# Patient Record
Sex: FEMALE | Race: BLACK OR AFRICAN AMERICAN | Employment: OTHER | URBAN - METROPOLITAN AREA
[De-identification: names, ages, dates, MRNs, and addresses within clinical notes are randomized per-mention and may not be internally consistent; named-entity substitution may affect disease eponyms.]

---

## 2017-02-27 PROBLEM — R65.21 SEPTIC SHOCK (HCC): Status: ACTIVE | Noted: 2017-02-27

## 2017-02-27 PROBLEM — A41.9 SEPTIC SHOCK (HCC): Status: ACTIVE | Noted: 2017-02-27

## 2017-02-27 PROBLEM — A41.9 SEPSIS (HCC): Status: ACTIVE | Noted: 2017-02-27

## 2017-02-27 PROBLEM — N10 PYELONEPHRITIS, ACUTE: Status: ACTIVE | Noted: 2017-02-27

## 2017-02-28 PROBLEM — A41.9 SEPTIC SHOCK (HCC): Status: RESOLVED | Noted: 2017-02-27 | Resolved: 2017-02-28

## 2017-02-28 PROBLEM — I95.9 HYPOTENSION: Status: ACTIVE | Noted: 2017-02-28

## 2017-02-28 PROBLEM — R65.21 SEPTIC SHOCK (HCC): Status: RESOLVED | Noted: 2017-02-27 | Resolved: 2017-02-28

## 2017-02-28 PROBLEM — A41.50 GRAM NEGATIVE SEPSIS (HCC): Status: ACTIVE | Noted: 2017-02-28

## 2018-07-15 ENCOUNTER — HOSPITAL ENCOUNTER (EMERGENCY)
Age: 58
Discharge: HOME | End: 2018-07-15
Attending: EMERGENCY MEDICINE
Payer: MEDICAID

## 2018-07-15 VITALS
OXYGEN SATURATION: 98 % | DIASTOLIC BLOOD PRESSURE: 74 MMHG | HEART RATE: 80 BPM | TEMPERATURE: 98.4 F | HEIGHT: 62 IN | RESPIRATION RATE: 16 BRPM | SYSTOLIC BLOOD PRESSURE: 113 MMHG | BODY MASS INDEX: 23.92 KG/M2 | WEIGHT: 130 LBS

## 2018-07-15 DIAGNOSIS — J06.9 UPPER RESPIRATORY TRACT INFECTION, UNSPECIFIED TYPE: ICD-10-CM

## 2018-07-15 DIAGNOSIS — R30.0 DYSURIA: Primary | ICD-10-CM

## 2018-07-15 LAB
BILIRUBIN URINE: NEGATIVE
BLOOD, URINE: NEGATIVE
CLARITY: CLEAR
COLOR: YELLOW
GLUCOSE URINE: NEGATIVE MG/DL
KETONES, URINE: NEGATIVE MG/DL
LEUKOCYTE ESTERASE, URINE: NEGATIVE
NITRITE, URINE: NEGATIVE
PH UA: 7 (ref 5–9)
PROTEIN UA: NEGATIVE MG/DL
SPECIFIC GRAVITY UA: 1.01 (ref 1–1.03)
UROBILINOGEN, URINE: 0.2 E.U./DL

## 2018-07-15 PROCEDURE — 87088 URINE BACTERIA CULTURE: CPT

## 2018-07-15 PROCEDURE — 99283 EMERGENCY DEPT VISIT LOW MDM: CPT

## 2018-07-15 PROCEDURE — 87077 CULTURE AEROBIC IDENTIFY: CPT

## 2018-07-15 PROCEDURE — 81003 URINALYSIS AUTO W/O SCOPE: CPT

## 2018-07-15 PROCEDURE — 87186 SC STD MICRODIL/AGAR DIL: CPT

## 2018-07-15 ASSESSMENT — ENCOUNTER SYMPTOMS
SHORTNESS OF BREATH: 0
ABDOMINAL PAIN: 0
NAUSEA: 0
STRIDOR: 0
VOMITING: 0
BACK PAIN: 1
SORE THROAT: 1

## 2018-07-16 NOTE — ED PROVIDER NOTES
HPI:  7/15/18,   Time: 11:04 PM         Jacque Salgado is a 59 y.o. female presenting to the ED for evaluation of discomfort urinating, beginning a few days ago. She states over the past week she is also noted bilateral ear pain and sore throat. Eyes fevers or chills. Denies a social nausea, vomiting, or diarrhea. Patient also has pain that radiates into her lower back. States that she wants became septic from a urinary tract infection therefore she wants to be treated early. She is currently on methotrexate. Denies presence of blood in the urine. Patient also states that her lymph nodes are swollen in her neck. She states her PCP diagnosed her with lymphoma and she has been following with the [facility] for this. She has an appointment on the 19th. They've not yet start treatment. The complaint has been intermittent, moderate in severity, and worsened by urinating. ROS: Review of Systems   Constitutional: Negative for chills, fever and malaise/fatigue. HENT: Positive for congestion, ear pain and sore throat. Respiratory: Negative for shortness of breath and stridor. Cardiovascular: Negative for chest pain. Gastrointestinal: Negative for abdominal pain, nausea and vomiting. Genitourinary: Positive for frequency and urgency. Negative for dysuria, flank pain and hematuria. Musculoskeletal: Positive for back pain. Neurological: Negative for dizziness and headaches. Endo/Heme/Allergies: Does not bruise/bleed easily. All other systems reviewed and are negative. Pertinent positives and negatives are stated within HPI, all other systems reviewed and are negative.  --------------------------------------------- PAST HISTORY ---------------------------------------------  Past Medical History:  has a past medical history of Arrhythmia; Arthritis; Hepatitis B; Sarcoidosis ([facility] [address] and Ulcer ([facility]     Past Surgical History:  has a past surgical history that includes Cholecystectomy; Upper respiratory tract infection, unspecified type        DISPOSITION  Disposition: Discharge to home  Patient condition is good                Vanessa Hein DO  07/15/18 [id number]

## 2018-07-18 LAB
ORGANISM: ABNORMAL
URINE CULTURE, ROUTINE: ABNORMAL
URINE CULTURE, ROUTINE: ABNORMAL

## 2019-03-07 ENCOUNTER — HOSPITAL ENCOUNTER (OUTPATIENT)
Age: 59
Discharge: HOME | End: 2019-03-07
Payer: MEDICAID

## 2019-03-07 LAB
ALBUMIN SERPL-MCNC: 4.4 G/DL (ref 3.5–5.2)
ALP BLD-CCNC: 48 U/L (ref 35–104)
ALT SERPL-CCNC: 25 U/L (ref 0–32)
AMYLASE: 151 U/L (ref 20–100)
ANION GAP SERPL CALCULATED.3IONS-SCNC: 10 MMOL/L (ref 7–16)
AST SERPL-CCNC: 29 U/L (ref 0–31)
BASOPHILS ABSOLUTE: 0.05 E9/L (ref 0–0.2)
BASOPHILS RELATIVE PERCENT: 0.9 % (ref 0–2)
BILIRUB SERPL-MCNC: 0.2 MG/DL (ref 0–1.2)
BUN BLDV-MCNC: 5 MG/DL (ref 8–23)
CALCIUM SERPL-MCNC: 9.6 MG/DL (ref 8.6–10.2)
CHLORIDE BLD-SCNC: 99 MMOL/L (ref 98–107)
CO2: 28 MMOL/L (ref 22–29)
CREAT SERPL-MCNC: 0.8 MG/DL (ref 0.5–1)
EOSINOPHILS ABSOLUTE: 0.18 E9/L (ref 0.05–0.5)
EOSINOPHILS RELATIVE PERCENT: 3.3 % (ref 0–6)
GFR AFRICAN AMERICAN: >60
GFR NON-AFRICAN AMERICAN: >60 ML/MIN/1.73
GLUCOSE BLD-MCNC: 87 MG/DL (ref 74–99)
HCT VFR BLD CALC: 38.4 % (ref 34–48)
HEMOGLOBIN: 12.5 G/DL (ref 11.5–15.5)
IMMATURE GRANULOCYTES #: 0.01 E9/L
IMMATURE GRANULOCYTES %: 0.2 % (ref 0–5)
LIPASE: 28 U/L (ref 13–60)
LYMPHOCYTES ABSOLUTE: 2.13 E9/L (ref 1.5–4)
LYMPHOCYTES RELATIVE PERCENT: 39.4 % (ref 20–42)
MCH RBC QN AUTO: 31.2 PG (ref 26–35)
MCHC RBC AUTO-ENTMCNC: 32.6 % (ref 32–34.5)
MCV RBC AUTO: 95.8 FL (ref 80–99.9)
MONOCYTES ABSOLUTE: 0.34 E9/L (ref 0.1–0.95)
MONOCYTES RELATIVE PERCENT: 6.3 % (ref 2–12)
NEUTROPHILS ABSOLUTE: 2.69 E9/L (ref 1.8–7.3)
NEUTROPHILS RELATIVE PERCENT: 49.9 % (ref 43–80)
PDW BLD-RTO: 12.8 FL (ref 11.5–15)
PLATELET # BLD: 219 E9/L (ref 130–450)
PMV BLD AUTO: 9.6 FL (ref 7–12)
POTASSIUM SERPL-SCNC: 3.3 MMOL/L (ref 3.5–5)
RBC # BLD: 4.01 E12/L (ref 3.5–5.5)
SODIUM BLD-SCNC: 137 MMOL/L (ref 132–146)
TOTAL PROTEIN: 8 G/DL (ref 6.4–8.3)
WBC # BLD: 5.4 E9/L (ref 4.5–11.5)

## 2019-03-07 PROCEDURE — 80053 COMPREHEN METABOLIC PANEL: CPT

## 2019-03-07 PROCEDURE — 83690 ASSAY OF LIPASE: CPT

## 2019-03-07 PROCEDURE — 36415 COLL VENOUS BLD VENIPUNCTURE: CPT

## 2019-03-07 PROCEDURE — 82150 ASSAY OF AMYLASE: CPT

## 2019-03-07 PROCEDURE — 85025 COMPLETE CBC W/AUTO DIFF WBC: CPT

## 2019-09-20 ENCOUNTER — HOSPITAL ENCOUNTER (EMERGENCY)
Age: 59
Discharge: HOME | End: 2019-09-20
Attending: EMERGENCY MEDICINE
Payer: MEDICAID

## 2019-09-20 ENCOUNTER — APPOINTMENT (OUTPATIENT)
Dept: GENERAL RADIOLOGY | Age: 59
End: 2019-09-20
Payer: MEDICAID

## 2019-09-20 VITALS
HEIGHT: 62 IN | DIASTOLIC BLOOD PRESSURE: 72 MMHG | HEART RATE: 72 BPM | WEIGHT: 137 LBS | RESPIRATION RATE: 18 BRPM | OXYGEN SATURATION: 99 % | SYSTOLIC BLOOD PRESSURE: 119 MMHG | BODY MASS INDEX: 25.21 KG/M2

## 2019-09-20 DIAGNOSIS — M25.561 ACUTE PAIN OF RIGHT KNEE: Primary | ICD-10-CM

## 2019-09-20 PROCEDURE — 99283 EMERGENCY DEPT VISIT LOW MDM: CPT

## 2019-09-20 PROCEDURE — 73560 X-RAY EXAM OF KNEE 1 OR 2: CPT

## 2019-09-20 PROCEDURE — 6370000000 HC RX 637 (ALT 250 FOR IP): Performed by: EMERGENCY MEDICINE

## 2019-09-20 RX ORDER — ACETAMINOPHEN 325 MG/1
650 TABLET ORAL ONCE
Status: COMPLETED | OUTPATIENT
Start: 2019-09-20 | End: 2019-09-20

## 2019-09-20 RX ADMIN — ACETAMINOPHEN 650 MG: 325 TABLET, FILM COATED ORAL at 18:22

## 2019-09-20 ASSESSMENT — PAIN SCALES - GENERAL
PAINLEVEL_OUTOF10: 5
PAINLEVEL_OUTOF10: 5

## 2019-09-20 ASSESSMENT — PAIN - FUNCTIONAL ASSESSMENT: PAIN_FUNCTIONAL_ASSESSMENT: PREVENTS OR INTERFERES SOME ACTIVE ACTIVITIES AND ADLS

## 2019-09-20 ASSESSMENT — ENCOUNTER SYMPTOMS
VOMITING: 0
BACK PAIN: 0
NAUSEA: 0
EYE PAIN: 0
WHEEZING: 0
ABDOMINAL DISTENTION: 0
DIARRHEA: 0
EYE REDNESS: 0
COUGH: 0
SINUS PRESSURE: 0
SHORTNESS OF BREATH: 0
EYE DISCHARGE: 0
SORE THROAT: 0

## 2019-09-20 ASSESSMENT — PAIN DESCRIPTION - LOCATION: LOCATION: KNEE

## 2019-09-20 ASSESSMENT — PAIN DESCRIPTION - PAIN TYPE: TYPE: ACUTE PAIN

## 2019-09-20 ASSESSMENT — PAIN DESCRIPTION - DESCRIPTORS: DESCRIPTORS: ACHING;DISCOMFORT

## 2019-09-20 ASSESSMENT — PAIN DESCRIPTION - ORIENTATION: ORIENTATION: RIGHT

## 2019-09-20 ASSESSMENT — PAIN DESCRIPTION - ONSET: ONSET: ON-GOING

## 2019-09-20 ASSESSMENT — PAIN DESCRIPTION - PROGRESSION: CLINICAL_PROGRESSION: GRADUALLY WORSENING

## 2019-09-20 ASSESSMENT — PAIN DESCRIPTION - FREQUENCY: FREQUENCY: CONTINUOUS

## 2019-09-20 NOTE — ED PROVIDER NOTES
todays results, in addition to providing specific details for the plan of care and counseling regarding the diagnosis and prognosis. Their questions are answered at this time and they are agreeable with the plan. I discussed at length with them reasons for immediate return here for re evaluation. They will followup with primary care by calling their office tomorrow. --------------------------------- ADDITIONAL PROVIDER NOTES ---------------------------------  At this time the patient is without objective evidence of an acute process requiring hospitalization or inpatient management. They have remained hemodynamically stable throughout their entire ED visit and are stable for discharge with outpatient follow-up. The plan has been discussed in detail and they are aware of the specific conditions for emergent return, as well as the importance of follow-up. Discharge Medication List as of 9/20/2019  7:14 PM          Diagnosis:  1. Acute pain of right knee        Disposition:  Patient's disposition: Discharge to home  Patient's condition is stable.          Irineo Morgan DO  Resident  09/20/19 2120

## 2020-02-09 ENCOUNTER — HOSPITAL ENCOUNTER (EMERGENCY)
Age: 60
Discharge: HOME | End: 2020-02-09
Attending: EMERGENCY MEDICINE
Payer: MEDICAID

## 2020-02-09 VITALS
SYSTOLIC BLOOD PRESSURE: 152 MMHG | DIASTOLIC BLOOD PRESSURE: 74 MMHG | OXYGEN SATURATION: 100 % | TEMPERATURE: 98.6 F | HEART RATE: 88 BPM | RESPIRATION RATE: 18 BRPM | WEIGHT: 135 LBS | HEIGHT: 62 IN | BODY MASS INDEX: 24.84 KG/M2

## 2020-02-09 PROCEDURE — 6360000002 HC RX W HCPCS: Performed by: EMERGENCY MEDICINE

## 2020-02-09 PROCEDURE — 99282 EMERGENCY DEPT VISIT SF MDM: CPT

## 2020-02-09 PROCEDURE — 96372 THER/PROPH/DIAG INJ SC/IM: CPT

## 2020-02-09 RX ORDER — MORPHINE SULFATE 5 MG/ML
5 INJECTION, SOLUTION INTRAMUSCULAR; INTRAVENOUS ONCE
Status: COMPLETED | OUTPATIENT
Start: 2020-02-09 | End: 2020-02-09

## 2020-02-09 RX ADMIN — MORPHINE SULFATE 5 MG: 5 INJECTION, SOLUTION INTRAMUSCULAR; INTRAVENOUS at 08:09

## 2020-02-09 ASSESSMENT — ENCOUNTER SYMPTOMS
BACK PAIN: 0
EYE REDNESS: 0
COUGH: 0
VOMITING: 0
WHEEZING: 0
SHORTNESS OF BREATH: 0
DIARRHEA: 0
SORE THROAT: 0
EYE DISCHARGE: 0
ABDOMINAL DISTENTION: 0
EYE PAIN: 0
SINUS PRESSURE: 0
NAUSEA: 0

## 2020-02-09 ASSESSMENT — PAIN SCALES - GENERAL: PAINLEVEL_OUTOF10: 10

## 2020-02-09 NOTE — ED PROVIDER NOTES
[ibuprofen]; and Tape [adhesive tape]    -------------------------------------------------- RESULTS -------------------------------------------------  Labs:  No results found for this visit on 02/09/20. Radiology:  No orders to display       ------------------------- NURSING NOTES AND VITALS REVIEWED ---------------------------  Date / Time Roomed:  2/9/2020  7:30 AM  ED Bed Assignment:  10/10    The nursing notes within the ED encounter and vital signs as below have been reviewed. BP (!) 152/74   Pulse 88   Temp 98.6 °F (37 °C) (Oral)   Resp 18   Ht 5' 2\" (1.575 m)   Wt 135 lb (61.2 kg)   LMP 11/12/2012 (LMP Unknown)   SpO2 100%   BMI 24.69 kg/m²   Oxygen Saturation Interpretation: Normal      ------------------------------------------ PROGRESS NOTES ------------------------------------------  7:57 AM  I have spoken with the patient and discussed todays results, in addition to providing specific details for the plan of care and counseling regarding the diagnosis and prognosis. Their questions are answered at this time and they are agreeable with the plan. I discussed at length with them reasons for immediate return here for re evaluation. They will followup with their primary care physician by calling their office tomorrow. --------------------------------- ADDITIONAL PROVIDER NOTES ---------------------------------  At this time the patient is without objective evidence of an acute process requiring hospitalization or inpatient management. They have remained hemodynamically stable throughout their entire ED visit and are stable for discharge with outpatient follow-up. The plan has been discussed in detail and they are aware of the specific conditions for emergent return, as well as the importance of follow-up. New Prescriptions    No medications on file       Diagnosis:  1.  Post-operative pain        Disposition:  Patient's disposition: Discharge to home  Patient's condition is stable.           Isabella Torres, [address] [place] [address]  02/09/20 2002

## 2020-08-03 ENCOUNTER — HOSPITAL ENCOUNTER (EMERGENCY)
Age: 60
Discharge: HOME | End: 2020-08-03
Attending: EMERGENCY MEDICINE
Payer: MEDICAID

## 2020-08-03 VITALS
OXYGEN SATURATION: 99 % | DIASTOLIC BLOOD PRESSURE: 81 MMHG | HEIGHT: 62 IN | TEMPERATURE: 98.9 F | SYSTOLIC BLOOD PRESSURE: 128 MMHG | WEIGHT: 140 LBS | HEART RATE: 100 BPM | BODY MASS INDEX: 25.76 KG/M2 | RESPIRATION RATE: 18 BRPM

## 2020-08-03 PROCEDURE — 99282 EMERGENCY DEPT VISIT SF MDM: CPT

## 2020-08-03 RX ORDER — OXYCODONE HYDROCHLORIDE AND ACETAMINOPHEN 5; 325 MG/1; MG/1
1 TABLET ORAL EVERY 6 HOURS PRN
Qty: 10 TABLET | Refills: 0 | Status: SHIPPED | OUTPATIENT
Start: 2020-08-03 | End: 2020-08-07

## 2020-08-03 RX ORDER — ACYCLOVIR 400 MG/1
800 TABLET ORAL
Qty: 70 TABLET | Refills: 0 | Status: SHIPPED | OUTPATIENT
Start: 2020-08-03 | End: 2020-08-10

## 2020-08-03 RX ORDER — ONDANSETRON 4 MG/1
4 TABLET, ORALLY DISINTEGRATING ORAL EVERY 8 HOURS PRN
Qty: 10 TABLET | Refills: 0 | Status: SHIPPED | OUTPATIENT
Start: 2020-08-03

## 2020-08-03 ASSESSMENT — PAIN SCALES - GENERAL: PAINLEVEL_OUTOF10: 7

## 2020-08-03 ASSESSMENT — ENCOUNTER SYMPTOMS
EYE PAIN: 0
NAUSEA: 1
SINUS PRESSURE: 0
RHINORRHEA: 0
PHOTOPHOBIA: 0
EYE REDNESS: 0
TROUBLE SWALLOWING: 0
FACIAL SWELLING: 0
DIARRHEA: 0
EYE ITCHING: 0
SORE THROAT: 0
EYE DISCHARGE: 0
BACK PAIN: 0
VOMITING: 1
ABDOMINAL DISTENTION: 0
COUGH: 0
WHEEZING: 0
ABDOMINAL PAIN: 0
SHORTNESS OF BREATH: 0

## 2020-08-03 ASSESSMENT — PAIN DESCRIPTION - PAIN TYPE: TYPE: ACUTE PAIN

## 2020-08-03 ASSESSMENT — PAIN DESCRIPTION - LOCATION: LOCATION: FACE

## 2020-08-03 ASSESSMENT — PAIN DESCRIPTION - ORIENTATION: ORIENTATION: LEFT

## 2020-08-03 NOTE — ED PROVIDER NOTES
Chief complaint:  Facial rash    HPI history provided by the patient  Patient comes in stating several days ago she developed a painful lump behind her left ear and then developed some vague symptoms, left general facial pain with couple of bouts of vomiting and just malaise and not feeling well and then broke out in a rash on the left forehead and lateral cheek region. No actual eye pain or eye involvement. No lesions on the nose. No intraoral lesions. No extremity numbness, tingling, paresthesias or weakness. No general headache. No stiff neck. No actual fevers. No chest pain, palpitations or shortness of breath. No cough or congestion or sore throat. No abdominal pain, diarrhea. No flank pain or dysuria. Has tried a little Motrin over the time with mild improvement. Review of Systems   Constitutional: Positive for chills and fatigue. Negative for diaphoresis and fever. HENT: Positive for ear pain. Negative for congestion, facial swelling, postnasal drip, rhinorrhea, sinus pressure, sore throat and trouble swallowing. Eyes: Negative for photophobia, pain, discharge, redness, itching and visual disturbance. Respiratory: Negative for cough, shortness of breath and wheezing. Cardiovascular: Negative for chest pain, palpitations and leg swelling. Gastrointestinal: Positive for nausea and vomiting. Negative for abdominal distention, abdominal pain and diarrhea. Genitourinary: Negative for dysuria, flank pain, frequency, hematuria and urgency. Musculoskeletal: Negative for arthralgias, back pain, gait problem, joint swelling, myalgias, neck pain and neck stiffness. Skin: Positive for rash. Negative for wound. Neurological: Negative for dizziness, seizures, syncope, facial asymmetry, speech difficulty, weakness, light-headedness, numbness and headaches. Hematological: Positive for adenopathy. Does not bruise/bleed easily. All other systems reviewed and are negative.        Physical Exam  Vitals signs and nursing note reviewed. Constitutional:       General: She is not in acute distress. Appearance: She is well-developed. She is not ill-appearing, toxic-appearing or diaphoretic. HENT:      Head: Normocephalic and atraumatic. Comments: Patient with 1 small postauricular lymph node to the left ear area. Otherwise has mild erythematous vesicular rash to the left forehead and lateral cheek and a couple of dots on the left upper eyelid all in a dermatomal pattern not crossing midline consistent with shingles. Right Ear: Tympanic membrane, ear canal and external ear normal.      Left Ear: Tympanic membrane, ear canal and external ear normal.      Ears:      Comments: No lesions on the nasal tip. No lesions in the canals bilaterally. Nose: Nose normal.      Mouth/Throat:      Lips: No lesions. Pharynx: Oropharynx is clear. Uvula midline. No pharyngeal swelling, oropharyngeal exudate, posterior oropharyngeal erythema or uvula swelling. Tonsils: No tonsillar exudate or tonsillar abscesses. Comments: Somewhat poor dentition with no gross necrotic teeth and no gingival erythema, pointing, signs of abscess or ANUG. No trismus or stridor  Eyes:      Extraocular Movements: Extraocular movements intact. Conjunctiva/sclera: Conjunctivae normal.      Pupils: Pupils are equal, round, and reactive to light. Comments: Sclera white, no injection, no drainage, no signs of eye involvement at this time. Neck:      Musculoskeletal: Normal range of motion and neck supple. Normal range of motion. No neck rigidity, injury, pain with movement, spinous process tenderness or muscular tenderness. Trachea: Trachea and phonation normal. No tracheal tenderness or tracheal deviation. Comments: No adenopathy or meningeal signs  Cardiovascular:      Rate and Rhythm: Normal rate and regular rhythm. Heart sounds: Normal heart sounds. No murmur.    Pulmonary: Effort: Pulmonary effort is normal. No respiratory distress. Breath sounds: Normal breath sounds. No stridor. No wheezing, rhonchi or rales. Chest:      Chest wall: No tenderness. Abdominal:      General: Bowel sounds are normal. There is no distension. Palpations: Abdomen is soft. Tenderness: There is no abdominal tenderness. There is no right CVA tenderness, left CVA tenderness, guarding or rebound. Musculoskeletal:         General: No swelling, tenderness, deformity or signs of injury. Right lower leg: No edema. Left lower leg: No edema. Lymphadenopathy:      Cervical: No cervical adenopathy. Skin:     General: Skin is warm and dry. Coloration: Skin is not cyanotic, jaundiced, mottled or pale. Findings: Rash present. No erythema. Comments: Left facial erythematous, vesicular rash with no signs of cellulitis or abscess otherwise. Neurological:      General: No focal deficit present. Mental Status: She is alert and oriented to person, place, and time. Cranial Nerves: No cranial nerve deficit. Coordination: Coordination normal.          Procedures     [facility]                --------------------------------------------- PAST HISTORY ---------------------------------------------  Past Medical History:  has a past medical history of Arrhythmia, Arthritis, Hepatitis B, Sarcoidosis, and Ulcer. Past Surgical History:  has a past surgical history that includes Cholecystectomy; Ectopic pregnancy surgery; Foot surgery (Left); liver biopsy (2015); and Dental surgery. Social History:  reports that she has never smoked. She has never used smokeless tobacco. She reports that she does not drink alcohol or use drugs. Family History: family history includes Arthritis in her paternal grandfather; Diabetes in her maternal grandmother and paternal grandmother; Stroke in her father. The patients home medications have been reviewed.     Allergies: Latex; hours as needed for Nausea or Vomiting    OXYCODONE-ACETAMINOPHEN (PERCOCET) 5-325 MG PER TABLET    Take 1 tablet by mouth every 6 hours as needed for Pain for up to 10 doses. Intended supply: 3 days. Take lowest dose possible to manage pain       Diagnosis:  1. Herpes zoster without complication        Disposition:  Patient's disposition: Discharge to home  Patient's condition is stable.             Josy Rater,   08/03/20 7301

## 2020-12-04 ENCOUNTER — HOSPITAL ENCOUNTER (OUTPATIENT)
Dept: CT IMAGING | Age: 60
Discharge: HOME | End: 2020-12-04
Payer: MEDICAID

## 2020-12-04 PROCEDURE — 74177 CT ABD & PELVIS W/CONTRAST: CPT

## 2021-01-17 ENCOUNTER — HOSPITAL ENCOUNTER (EMERGENCY)
Age: 61
Discharge: HOME | End: 2021-01-17
Attending: EMERGENCY MEDICINE
Payer: MEDICAID

## 2021-01-17 ENCOUNTER — APPOINTMENT (OUTPATIENT)
Dept: GENERAL RADIOLOGY | Age: 61
End: 2021-01-17
Payer: MEDICAID

## 2021-01-17 ENCOUNTER — APPOINTMENT (OUTPATIENT)
Dept: ULTRASOUND IMAGING | Age: 61
End: 2021-01-17
Payer: MEDICAID

## 2021-01-17 VITALS
SYSTOLIC BLOOD PRESSURE: 153 MMHG | OXYGEN SATURATION: 100 % | HEART RATE: 77 BPM | DIASTOLIC BLOOD PRESSURE: 86 MMHG | RESPIRATION RATE: 18 BRPM | BODY MASS INDEX: 25.61 KG/M2 | TEMPERATURE: 98.6 F | WEIGHT: 140 LBS

## 2021-01-17 DIAGNOSIS — E87.1 HYPONATREMIA: Primary | ICD-10-CM

## 2021-01-17 DIAGNOSIS — M25.511 ACUTE PAIN OF RIGHT SHOULDER: ICD-10-CM

## 2021-01-17 DIAGNOSIS — M79.10 MYALGIA: ICD-10-CM

## 2021-01-17 LAB
ALBUMIN SERPL-MCNC: 4 G/DL (ref 3.5–5.2)
ALP BLD-CCNC: 57 U/L (ref 35–104)
ALT SERPL-CCNC: 20 U/L (ref 0–32)
ANION GAP SERPL CALCULATED.3IONS-SCNC: 9 MMOL/L (ref 7–16)
AST SERPL-CCNC: 29 U/L (ref 0–31)
BASOPHILS ABSOLUTE: 0.04 E9/L (ref 0–0.2)
BASOPHILS RELATIVE PERCENT: 0.7 % (ref 0–2)
BILIRUB SERPL-MCNC: 0.4 MG/DL (ref 0–1.2)
BILIRUBIN URINE: NEGATIVE
BLOOD, URINE: NEGATIVE
BUN BLDV-MCNC: 7 MG/DL (ref 8–23)
CALCIUM SERPL-MCNC: 9.8 MG/DL (ref 8.6–10.2)
CHLORIDE BLD-SCNC: 93 MMOL/L (ref 98–107)
CLARITY: CLEAR
CO2: 27 MMOL/L (ref 22–29)
COLOR: YELLOW
CREAT SERPL-MCNC: 0.8 MG/DL (ref 0.5–1)
EOSINOPHILS ABSOLUTE: 0.15 E9/L (ref 0.05–0.5)
EOSINOPHILS RELATIVE PERCENT: 2.7 % (ref 0–6)
GFR AFRICAN AMERICAN: >60
GFR NON-AFRICAN AMERICAN: >60 ML/MIN/1.73
GLUCOSE BLD-MCNC: 99 MG/DL (ref 74–99)
GLUCOSE URINE: NEGATIVE MG/DL
HCT VFR BLD CALC: 28.8 % (ref 34–48)
HEMOGLOBIN: 9.3 G/DL (ref 11.5–15.5)
IMMATURE GRANULOCYTES #: 0.02 E9/L
IMMATURE GRANULOCYTES %: 0.4 % (ref 0–5)
KETONES, URINE: NEGATIVE MG/DL
LEUKOCYTE ESTERASE, URINE: NEGATIVE
LYMPHOCYTES ABSOLUTE: 1.66 E9/L (ref 1.5–4)
LYMPHOCYTES RELATIVE PERCENT: 29.4 % (ref 20–42)
MCH RBC QN AUTO: 31.7 PG (ref 26–35)
MCHC RBC AUTO-ENTMCNC: 32.3 % (ref 32–34.5)
MCV RBC AUTO: 98.3 FL (ref 80–99.9)
MONOCYTES ABSOLUTE: 0.54 E9/L (ref 0.1–0.95)
MONOCYTES RELATIVE PERCENT: 9.6 % (ref 2–12)
NEUTROPHILS ABSOLUTE: 3.23 E9/L (ref 1.8–7.3)
NEUTROPHILS RELATIVE PERCENT: 57.2 % (ref 43–80)
NITRITE, URINE: NEGATIVE
PDW BLD-RTO: 14.7 FL (ref 11.5–15)
PH UA: 7 (ref 5–9)
PLATELET # BLD: 312 E9/L (ref 130–450)
PMV BLD AUTO: 9.2 FL (ref 7–12)
POTASSIUM REFLEX MAGNESIUM: 4.2 MMOL/L (ref 3.5–5)
PROTEIN UA: NEGATIVE MG/DL
RBC # BLD: 2.93 E12/L (ref 3.5–5.5)
SODIUM BLD-SCNC: 129 MMOL/L (ref 132–146)
SPECIFIC GRAVITY UA: 1.01 (ref 1–1.03)
TOTAL CK: 111 U/L (ref 20–180)
TOTAL PROTEIN: 9.3 G/DL (ref 6.4–8.3)
TROPONIN: <0.01 NG/ML (ref 0–0.03)
UROBILINOGEN, URINE: 0.2 E.U./DL
WBC # BLD: 5.6 E9/L (ref 4.5–11.5)

## 2021-01-17 PROCEDURE — 99284 EMERGENCY DEPT VISIT MOD MDM: CPT

## 2021-01-17 PROCEDURE — 81003 URINALYSIS AUTO W/O SCOPE: CPT

## 2021-01-17 PROCEDURE — 84484 ASSAY OF TROPONIN QUANT: CPT

## 2021-01-17 PROCEDURE — 93005 ELECTROCARDIOGRAM TRACING: CPT | Performed by: EMERGENCY MEDICINE

## 2021-01-17 PROCEDURE — 73030 X-RAY EXAM OF SHOULDER: CPT

## 2021-01-17 PROCEDURE — 82550 ASSAY OF CK (CPK): CPT

## 2021-01-17 PROCEDURE — 93971 EXTREMITY STUDY: CPT

## 2021-01-17 PROCEDURE — 85025 COMPLETE CBC W/AUTO DIFF WBC: CPT

## 2021-01-17 PROCEDURE — U0003 INFECTIOUS AGENT DETECTION BY NUCLEIC ACID (DNA OR RNA); SEVERE ACUTE RESPIRATORY SYNDROME CORONAVIRUS 2 (SARS-COV-2) (CORONAVIRUS DISEASE [COVID-19]), AMPLIFIED PROBE TECHNIQUE, MAKING USE OF HIGH THROUGHPUT TECHNOLOGIES AS DESCRIBED BY CMS-2020-01-R: HCPCS

## 2021-01-17 PROCEDURE — 36415 COLL VENOUS BLD VENIPUNCTURE: CPT

## 2021-01-17 PROCEDURE — 6370000000 HC RX 637 (ALT 250 FOR IP): Performed by: EMERGENCY MEDICINE

## 2021-01-17 PROCEDURE — 71045 X-RAY EXAM CHEST 1 VIEW: CPT

## 2021-01-17 PROCEDURE — 80053 COMPREHEN METABOLIC PANEL: CPT

## 2021-01-17 PROCEDURE — 2580000003 HC RX 258: Performed by: EMERGENCY MEDICINE

## 2021-01-17 RX ORDER — 0.9 % SODIUM CHLORIDE 0.9 %
1000 INTRAVENOUS SOLUTION INTRAVENOUS ONCE
Status: COMPLETED | OUTPATIENT
Start: 2021-01-17 | End: 2021-01-17

## 2021-01-17 RX ORDER — ACETAMINOPHEN 500 MG
1000 TABLET ORAL ONCE
Status: COMPLETED | OUTPATIENT
Start: 2021-01-17 | End: 2021-01-17

## 2021-01-17 RX ADMIN — SODIUM CHLORIDE 1000 ML: 9 INJECTION, SOLUTION INTRAVENOUS at 17:32

## 2021-01-17 RX ADMIN — SODIUM CHLORIDE 1000 ML: 9 INJECTION, SOLUTION INTRAVENOUS at 19:44

## 2021-01-17 RX ADMIN — ACETAMINOPHEN 1000 MG: 500 TABLET, FILM COATED ORAL at 19:26

## 2021-01-17 ASSESSMENT — PAIN DESCRIPTION - LOCATION: LOCATION: GENERALIZED

## 2021-01-17 ASSESSMENT — PAIN DESCRIPTION - FREQUENCY: FREQUENCY: CONTINUOUS

## 2021-01-17 NOTE — ED PROVIDER NOTES
HPI:  1/17/21, Time: 5:15 PM PAM Bazzi is a 56 y.o. female presenting to the ED for diffuse body aches and decreased urine output, beginning 1 week ago. The complaint has been persistent, mild in severity, and worsened by nothing. Patient says she did some squats a week ago while working out. She began having pain in the legs afterwards and this progressed to diffuse body pain. Patient says that now her right arm is bothering her. Describes the pain as an aching sensation that starts in the shoulder and radiates down the right arm. Patient denies trauma or injury. No associated numbness/tingling. Says she just used the arm to hold weights. Also notes that she's had decreased urine output over the last week. Today there was only a small amount of urine. She's been consuming fluids. Today she's had 6 half bottles of water. She denies fever, chills, chest pain, shortness of breath, abdominal pain, nausea, vomiting, diarrhea, constipation. No history of sick contacts. ROS:   Pertinent positives and negatives are stated within HPI, all other systems reviewed and are negative.  --------------------------------------------- PAST HISTORY ---------------------------------------------  Past Medical History:  has a past medical history of Arrhythmia, Arthritis, Hepatitis B, Sarcoidosis, and Ulcer. Past Surgical History:  has a past surgical history that includes Cholecystectomy; Ectopic pregnancy surgery; Foot surgery (Left); liver biopsy (2015); and Dental surgery. Social History:  reports that she has never smoked. She has never used smokeless tobacco. She reports that she does not drink alcohol or use drugs. Family History: family history includes Arthritis in her paternal grandfather; Diabetes in her maternal grandmother and paternal grandmother; Stroke in her father. The patients home medications have been reviewed.     Allergies: Latex, Aspirin, Motrin [ibuprofen], and Tape Buel Umm tape]    ---------------------------------------------------PHYSICAL EXAM--------------------------------------    Constitutional/General: Alert and oriented x3, well appearing, non toxic in NAD  Head: Normocephalic and atraumatic  Eyes: PERRL, EOMI  Mouth: Oropharynx clear, handling secretions, no trismus  Neck: Supple, full ROM, non tender to palpation in the midline, no stridor, no crepitus, no meningeal signs  Pulmonary: Lungs clear to auscultation bilaterally, no wheezes, rales, or rhonchi. Not in respiratory distress  Cardiovascular:  Regular rate. Regular rhythm. No murmurs, gallops, or rubs. 2+ distal pulses  Chest: no chest wall tenderness  Abdomen: Soft. Non tender. Non distended. +BS. No rebound, guarding, or rigidity. No pulsatile masses appreciated. Musculoskeletal: Moves all extremities x 4. Right shoulder tenderness to palpation. Pain with abduction, adduction, flexion. Warm and well perfused, no clubbing, cyanosis, or edema. Capillary refill <3 seconds  Skin: warm and dry. No rashes. Neurologic: GCS 15, CN 2-12 grossly intact, no focal deficits, symmetric strength 5/5 in the upper and lower extremities bilaterally  Psych: Normal Affect    -------------------------------------------------- RESULTS -------------------------------------------------  I have personally reviewed all laboratory and imaging results for this patient. Results are listed below.      LABS:  Results for orders placed or performed during the hospital encounter of 01/17/21   CBC Auto Differential   Result Value Ref Range    WBC 5.6 4.5 - 11.5 E9/L    RBC 2.93 (L) 3.50 - 5.50 E12/L    Hemoglobin 9.3 (L) 11.5 - 15.5 g/dL    Hematocrit 28.8 (L) 34.0 - 48.0 %    MCV 98.3 80.0 - 99.9 fL    MCH 31.7 26.0 - 35.0 pg    MCHC 32.3 32.0 - 34.5 %    RDW 14.7 11.5 - 15.0 fL    Platelets 625 629 - 130 E9/L    MPV 9.2 7.0 - 12.0 fL    Neutrophils % 57.2 43.0 - 80.0 %    Immature Granulocytes % 0.4 0.0 - 5.0 %    Lymphocytes % 29.4 20.0 - 42.0 %    Monocytes % 9.6 2.0 - 12.0 %    Eosinophils % 2.7 0.0 - 6.0 %    Basophils % 0.7 0.0 - 2.0 %    Neutrophils Absolute 3.23 1.80 - 7.30 E9/L    Immature Granulocytes # 0.02 E9/L    Lymphocytes Absolute 1.66 1.50 - 4.00 E9/L    Monocytes Absolute 0.54 0.10 - 0.95 E9/L    Eosinophils Absolute 0.15 0.05 - 0.50 E9/L    Basophils Absolute 0.04 0.00 - 0.20 E9/L   Comprehensive Metabolic Panel w/ Reflex to MG   Result Value Ref Range    Sodium 129 (L) 132 - 146 mmol/L    Potassium reflex Magnesium 4.2 3.5 - 5.0 mmol/L    Chloride 93 (L) 98 - 107 mmol/L    CO2 27 22 - 29 mmol/L    Anion Gap 9 7 - 16 mmol/L    Glucose 99 74 - 99 mg/dL    BUN 7 (L) 8 - 23 mg/dL    CREATININE 0.8 0.5 - 1.0 mg/dL    GFR Non-African American >60 >=60 mL/min/1.73    GFR African American >60     Calcium 9.8 8.6 - 10.2 mg/dL    Total Protein 9.3 (H) 6.4 - 8.3 g/dL    Alb 4.0 3.5 - 5.2 g/dL    Total Bilirubin 0.4 0.0 - 1.2 mg/dL    Alkaline Phosphatase 57 35 - 104 U/L    ALT 20 0 - 32 U/L    AST 29 0 - 31 U/L   Troponin   Result Value Ref Range    Troponin <0.01 0.00 - 0.03 ng/mL   Urinalysis, reflex to microscopic   Result Value Ref Range    Color, UA Yellow Straw/Yellow    Clarity, UA Clear Clear    Glucose, Ur Negative Negative mg/dL    Bilirubin Urine Negative Negative    Ketones, Urine Negative Negative mg/dL    Specific Gravity, UA 1.010 1.005 - 1.030    Blood, Urine Negative Negative    pH, UA 7.0 5.0 - 9.0    Protein, UA Negative Negative mg/dL    Urobilinogen, Urine 0.2 <2.0 E.U./dL    Nitrite, Urine Negative Negative    Leukocyte Esterase, Urine Negative Negative   CK   Result Value Ref Range    Total  20 - 180 U/L   Covid-19 Ambulatory   Result Value Ref Range    Source NP swab    EKG 12 Lead   Result Value Ref Range    Ventricular Rate 74 BPM    Atrial Rate 74 BPM    P-R Interval 140 ms    QRS Duration 82 ms    Q-T Interval 382 ms    QTc Calculation (Bazett) 424 ms    P Axis 51 degrees    R Axis 8 degrees    T Axis 51 degrees       RADIOLOGY:  Interpreted by Radiologist.  XR SHOULDER RIGHT (MIN 2 VIEWS)   Final Result   No acute bony pathology. Mild arthritis. XR CHEST PORTABLE   Final Result   No acute process. US DUP UPPER EXTREMITY RIGHT VENOUS   Final Result   No evidence of DVT. EKG Interpretation  Interpreted by emergency department physician    Rhythm: normal sinus   Rate: normal  Axis: normal  Conduction: normal  ST Segments: no acute change  T Waves: no acute change    Clinical Impression: no acute changes  Comparison to prior EKG: was normal and None      ------------------------- NURSING NOTES AND VITALS REVIEWED ---------------------------   The nursing notes within the ED encounter and vital signs as below have been reviewed by myself. BP (!) 153/86   Pulse 77   Temp 98.6 °F (37 °C) (Temporal)   Resp 18   Wt 140 lb (63.5 kg)   LMP 11/12/2012 (LMP Unknown)   SpO2 100%   BMI 25.61 kg/m²   Oxygen Saturation Interpretation: Normal    The patients available past medical records and past encounters were reviewed. ------------------------------ ED COURSE/MEDICAL DECISION MAKING----------------------  Medications   0.9 % sodium chloride bolus (1,000 mLs Intravenous New Bag 1/17/21 1944)   0.9 % sodium chloride bolus (0 mLs Intravenous Stopped 1/17/21 1925)   acetaminophen (TYLENOL) tablet 1,000 mg (1,000 mg Oral Given 1/17/21 1926)             Medical Decision Making:    Vitals stable. Physical exam remarkable for right shoulder pain with ROM. Labs and imaging obtained. XR of the shoulder shows some mild arthritis. US does not show DVT. XR of the chest does not show an acute process. EKG is WNL. Labs remarkable for a low sodium. The patient says she had 6 half bottles of water. The equivalent to 3 regular 16oz bottles. Says she's had decreased urine output. Sodium likely from dehydration. Patient given fluids in the ED. Patient now making urine. Will discharge home.  Patient instructed to take medication prescribed as directed, to followup with her PCP this week for a re-evaluation, and to return for worsening symptoms. Will provide orthopedic surgery follow up for right shoulder pain. Re-Evaluations:             Re-evaluation. Patients symptoms are improving      This patient's ED course included: a personal history and physicial examination, re-evaluation prior to disposition, multiple bedside re-evaluations, IV medications, cardiac monitoring, continuous pulse oximetry and a personal history and physicial eaxmination    This patient has remained hemodynamically stable during their ED course. Counseling: The emergency provider has spoken with the patient and discussed todays results, in addition to providing specific details for the plan of care and counseling regarding the diagnosis and prognosis. Questions are answered at this time and they are agreeable with the plan.       --------------------------------- IMPRESSION AND DISPOSITION ---------------------------------    IMPRESSION  1. Hyponatremia    2. Acute pain of right shoulder    3. Myalgia        DISPOSITION  Disposition: Discharge to home  Patient condition is stable        NOTE: This report was transcribed using voice recognition software.  Every effort was made to ensure accuracy; however, inadvertent computerized transcription errors may be present        Elijah Gar MD  01/17/21 2017

## 2021-01-18 ENCOUNTER — CARE COORDINATION (OUTPATIENT)
Dept: CARE COORDINATION | Age: 61
End: 2021-01-18

## 2021-01-18 LAB
EKG ATRIAL RATE: 74 BPM
EKG P AXIS: 51 DEGREES
EKG P-R INTERVAL: 140 MS
EKG Q-T INTERVAL: 382 MS
EKG QRS DURATION: 82 MS
EKG QTC CALCULATION (BAZETT): 424 MS
EKG R AXIS: 8 DEGREES
EKG T AXIS: 51 DEGREES
EKG VENTRICULAR RATE: 74 BPM
SARS-COV-2, PCR: NOT DETECTED

## 2021-01-18 PROCEDURE — 93010 ELECTROCARDIOGRAM REPORT: CPT | Performed by: INTERNAL MEDICINE

## 2021-01-18 NOTE — CARE COORDINATION
Unable to leave a First message for patient to return call re: ED Follow-up for Pt with myalgia/rt arm pain from working out, Dehydration and low sodium.   Covid pending  At Risk COVID-19   Plan to offer Loop Enrollment

## 2021-07-14 ENCOUNTER — HOSPITAL ENCOUNTER (EMERGENCY)
Age: 61
Discharge: HOME | End: 2021-07-14
Payer: MEDICAID

## 2021-07-14 ENCOUNTER — APPOINTMENT (OUTPATIENT)
Dept: CT IMAGING | Age: 61
End: 2021-07-14
Payer: MEDICAID

## 2021-07-14 VITALS
WEIGHT: 140 LBS | HEIGHT: 62 IN | TEMPERATURE: 98.1 F | RESPIRATION RATE: 18 BRPM | DIASTOLIC BLOOD PRESSURE: 75 MMHG | HEART RATE: 83 BPM | BODY MASS INDEX: 25.76 KG/M2 | OXYGEN SATURATION: 96 % | SYSTOLIC BLOOD PRESSURE: 125 MMHG

## 2021-07-14 DIAGNOSIS — S00.201A SUPERFICIAL INJURY OF RIGHT PERIOCULAR REGION, INITIAL ENCOUNTER: Primary | ICD-10-CM

## 2021-07-14 PROCEDURE — 6370000000 HC RX 637 (ALT 250 FOR IP): Performed by: PHYSICIAN ASSISTANT

## 2021-07-14 PROCEDURE — 70480 CT ORBIT/EAR/FOSSA W/O DYE: CPT

## 2021-07-14 PROCEDURE — 99283 EMERGENCY DEPT VISIT LOW MDM: CPT

## 2021-07-14 RX ORDER — TETRACAINE HYDROCHLORIDE 5 MG/ML
1 SOLUTION OPHTHALMIC ONCE
Status: COMPLETED | OUTPATIENT
Start: 2021-07-14 | End: 2021-07-14

## 2021-07-14 RX ADMIN — FLUORESCEIN SODIUM 1 EACH: 0.6 STRIP OPHTHALMIC at 20:32

## 2021-07-14 RX ADMIN — TETRACAINE HYDROCHLORIDE 1 DROP: 5 SOLUTION OPHTHALMIC at 20:32

## 2021-07-14 ASSESSMENT — PAIN DESCRIPTION - PAIN TYPE: TYPE: ACUTE PAIN

## 2021-07-14 ASSESSMENT — PAIN DESCRIPTION - ORIENTATION: ORIENTATION: RIGHT

## 2021-07-14 ASSESSMENT — PAIN SCALES - GENERAL: PAINLEVEL_OUTOF10: 5

## 2021-07-14 ASSESSMENT — PAIN DESCRIPTION - LOCATION: LOCATION: EYE

## 2021-07-14 NOTE — ED PROVIDER NOTES
[facility]      Department of Emergency Medicine   ED  Provider Note  Admit Date/RoomTime: 7/14/2021  7:30 PM  ED Room: 25/25        HPI:  7/14/21, Time: 7:55 PM EDT  . Constance Osullivan is a 59 y.o. old female presenting to the emergency department with sudden onset pain to right eye, which began earlier today. Since onset her symptoms have been persistent and mild in severity. Associated signs & symptoms of:  none. Patient reports that she was at [employer] earlier today when she went to try to get a box of chicken out of the freezer when a large block of ice hit her in her right eye. She denies any difficulty seeing or pain with extraocular movements. Mechanism:         FB Exposure:   yes. Chemical Exposure:   No.     Trauma:   Yes-see above     High Speed Machinery Use:   No.     Welding:   No.     Circumstances:    Contact Lens Use:   No.     Recent URI Sx's:   No.     Spontaneous Onset:   No.     Close Contacts w/Similar Sx's:   No.     Work Related:   No.     History of:         Glaucoma:  No.       Post-operative:  No.       Other Eye Disease:  No.  Tetanus Status: up to date. Review of Systems:   Pertinent positives and negatives are stated within HPI, all other systems reviewed and are negative.        --------------------------------------------- PAST HISTORY ---------------------------------------------  Past Medical History:  has a past medical history of Arrhythmia, Arthritis, Hepatitis B, Sarcoidosis, and Ulcer. Past Surgical History:  has a past surgical history that includes Cholecystectomy; Ectopic pregnancy surgery; Foot surgery (Left); liver biopsy (2015); and Dental surgery. Social History:  reports that she has never smoked. She has never used smokeless tobacco. She reports that she does not drink alcohol and does not use drugs.     Family History: family history includes Arthritis in her paternal grandfather; Diabetes in her maternal grandmother and paternal grandmother; Stroke in her father. The patients home medications have been reviewed. Allergies: Latex, Aspirin, Motrin [ibuprofen], and Tape [adhesive tape]        ---------------------------------------------------PHYSICAL EXAM--------------------------------------    Constitutional/General: Alert and oriented x3, well appearing, non toxic in NAD  Head: Normocephalic and atraumatic  Eyes: PERRL, 3 mm, conjunctiva normal bilaterally,  no evidence of hyphema, no evidence of entrapment. No pain with EOM, and EOMI. Direct and consensual light reflex normal. Tenderness to palpation to the inferior and lateral orbital bone. Mouth: Oropharynx clear, handling secretions, no trismus  Neck: Supple, full ROM, non tender to palpation in the midline, no stridor, no crepitus, no meningeal signs  Pulmonary: Lungs clear to auscultation bilaterally. Not in respiratory distress  Cardiovascular:  Regular rate. Regular rhythm  Abdomen: Soft. Non tender. Non distended. Musculoskeletal: Moves all extremities x 4. Warm and well perfused  Skin: warm and dry. No rashes. Neurologic: GCS 15  Psych: Normal Affect    -------------------------------------------------- RESULTS -------------------------------------------------  I have personally reviewed all laboratory and imaging results for this patient. Results are listed below. LABS:  No results found for this visit on 07/14/21. RADIOLOGY:  Interpreted by Jerrod Young   Final Result   Atraumatic appearance of the right orbit. The right ocular globe is intact. Nasal bone fractures of unknown acuity.                 ------------------------- NURSING NOTES AND VITALS REVIEWED ---------------------------   The nursing notes within the ED encounter and vital signs as below have been reviewed by myself.   /75   Pulse 83   Temp 98.1 °F (36.7 °C) (Oral)   Resp 18   Ht 5' 2\" (1.575 m)   Wt 140 lb (63.5 kg)   LMP 11/12/2012 (LMP Unknown) SpO2 96%   BMI 25.61 kg/m²   Oxygen Saturation Interpretation: Normal    The patients available past medical records and past encounters were reviewed. ------------------------------ ED COURSE/MEDICAL DECISION MAKING----------------------  Medications   fluorescein ophthalmic strip 1 each (1 each Right Eye Given 7/14/21 2032)   tetracaine (TETRAVISC) 0.5 % ophthalmic solution 1 drop (1 drop Right Eye Given 7/14/21 2032)       Flourescein stain: Negative. Medical Decision Making:      ED Course as of Jul 14 2245   Wed Jul 14, 2021 2049 Reassessed patient. Remained stable. Patient is porting that she needs to leave the emergency department at this time because she has to babysit her grandchildren. CT of the orbits is not resulted yet. Patient is not having any pain with extraocular movements. No evidence of corneal abrasion on exam today. The rest of her exam is unremarkable other than some pain over the inferior orbit. Advised that she can look up her results on [email or web address] if she chooses to leave prior to the results. Encouraged her to follow-up with your PCP for recheck return the emergency department with any new or worsening symptoms. Patient voiced understanding and is agreeable to the above treatment plan. [MS]      ED Course User Index  [MS] [place], [place]           This patient's ED course included: a personal history and physicial eaxmination    This patient has remained hemodynamically stable during their ED course. Counseling: The emergency provider has spoken with the patient and discussed todays results, in addition to providing specific details for the plan of care and counseling regarding the diagnosis and prognosis. Questions are answered at this time and they are agreeable with the plan.       --------------------------------- IMPRESSION AND DISPOSITION ---------------------------------    IMPRESSION  1.  Superficial injury of right periocular region, initial

## 2022-03-19 ENCOUNTER — APPOINTMENT (OUTPATIENT)
Dept: GENERAL RADIOLOGY | Age: 62
End: 2022-03-19
Payer: MEDICAID

## 2022-03-19 ENCOUNTER — HOSPITAL ENCOUNTER (EMERGENCY)
Age: 62
Discharge: HOME | End: 2022-03-20
Attending: STUDENT IN AN ORGANIZED HEALTH CARE EDUCATION/TRAINING PROGRAM
Payer: MEDICAID

## 2022-03-19 DIAGNOSIS — J04.0 LARYNGITIS: Primary | ICD-10-CM

## 2022-03-19 LAB
ANION GAP SERPL CALCULATED.3IONS-SCNC: 12 MMOL/L (ref 7–16)
BASOPHILS ABSOLUTE: 0 E9/L (ref 0–0.2)
BASOPHILS RELATIVE PERCENT: 0 % (ref 0–2)
BUN BLDV-MCNC: 9 MG/DL (ref 6–23)
CALCIUM SERPL-MCNC: 9 MG/DL (ref 8.6–10.2)
CHLORIDE BLD-SCNC: 104 MMOL/L (ref 98–107)
CO2: 22 MMOL/L (ref 22–29)
CREAT SERPL-MCNC: 0.8 MG/DL (ref 0.5–1)
EOSINOPHILS ABSOLUTE: 0.04 E9/L (ref 0.05–0.5)
EOSINOPHILS RELATIVE PERCENT: 1 % (ref 0–6)
GFR AFRICAN AMERICAN: >60
GFR NON-AFRICAN AMERICAN: >60 ML/MIN/1.73
GLUCOSE BLD-MCNC: 122 MG/DL (ref 74–99)
HCT VFR BLD CALC: 32.5 % (ref 34–48)
HEMOGLOBIN: 10.3 G/DL (ref 11.5–15.5)
LYMPHOCYTES ABSOLUTE: 0.5 E9/L (ref 1.5–4)
LYMPHOCYTES RELATIVE PERCENT: 14 % (ref 20–42)
MCH RBC QN AUTO: 30.7 PG (ref 26–35)
MCHC RBC AUTO-ENTMCNC: 31.7 % (ref 32–34.5)
MCV RBC AUTO: 97 FL (ref 80–99.9)
MONOCYTES ABSOLUTE: 0.18 E9/L (ref 0.1–0.95)
MONOCYTES RELATIVE PERCENT: 5 % (ref 2–12)
NEUTROPHILS ABSOLUTE: 2.88 E9/L (ref 1.8–7.3)
NEUTROPHILS RELATIVE PERCENT: 80 % (ref 43–80)
PDW BLD-RTO: 14.2 FL (ref 11.5–15)
PLATELET # BLD: 207 E9/L (ref 130–450)
PMV BLD AUTO: 9.8 FL (ref 7–12)
POTASSIUM REFLEX MAGNESIUM: 3.8 MMOL/L (ref 3.5–5)
RBC # BLD: 3.35 E12/L (ref 3.5–5.5)
RBC # BLD: NORMAL 10*6/UL
SODIUM BLD-SCNC: 138 MMOL/L (ref 132–146)
TROPONIN, HIGH SENSITIVITY: <6 NG/L (ref 0–9)
TSH SERPL DL<=0.05 MIU/L-ACNC: 0.95 UIU/ML (ref 0.27–4.2)
WBC # BLD: 3.6 E9/L (ref 4.5–11.5)

## 2022-03-19 PROCEDURE — 80048 BASIC METABOLIC PNL TOTAL CA: CPT

## 2022-03-19 PROCEDURE — 87635 SARS-COV-2 COVID-19 AMP PRB: CPT

## 2022-03-19 PROCEDURE — 85025 COMPLETE CBC W/AUTO DIFF WBC: CPT

## 2022-03-19 PROCEDURE — 84443 ASSAY THYROID STIM HORMONE: CPT

## 2022-03-19 PROCEDURE — 36415 COLL VENOUS BLD VENIPUNCTURE: CPT

## 2022-03-19 PROCEDURE — 99284 EMERGENCY DEPT VISIT MOD MDM: CPT

## 2022-03-19 PROCEDURE — 84484 ASSAY OF TROPONIN QUANT: CPT

## 2022-03-19 PROCEDURE — 93005 ELECTROCARDIOGRAM TRACING: CPT | Performed by: STUDENT IN AN ORGANIZED HEALTH CARE EDUCATION/TRAINING PROGRAM

## 2022-03-19 PROCEDURE — 71045 X-RAY EXAM CHEST 1 VIEW: CPT

## 2022-03-20 ENCOUNTER — APPOINTMENT (OUTPATIENT)
Dept: CT IMAGING | Age: 62
End: 2022-03-20
Payer: MEDICAID

## 2022-03-20 VITALS
BODY MASS INDEX: 25.4 KG/M2 | RESPIRATION RATE: 16 BRPM | HEART RATE: 88 BPM | DIASTOLIC BLOOD PRESSURE: 96 MMHG | TEMPERATURE: 98 F | SYSTOLIC BLOOD PRESSURE: 144 MMHG | HEIGHT: 62 IN | WEIGHT: 138 LBS | OXYGEN SATURATION: 99 %

## 2022-03-20 LAB — SARS-COV-2, NAAT: NOT DETECTED

## 2022-03-20 PROCEDURE — 6360000004 HC RX CONTRAST MEDICATION: Performed by: RADIOLOGY

## 2022-03-20 PROCEDURE — 94640 AIRWAY INHALATION TREATMENT: CPT

## 2022-03-20 PROCEDURE — 70491 CT SOFT TISSUE NECK W/DYE: CPT

## 2022-03-20 PROCEDURE — 6370000000 HC RX 637 (ALT 250 FOR IP): Performed by: STUDENT IN AN ORGANIZED HEALTH CARE EDUCATION/TRAINING PROGRAM

## 2022-03-20 RX ORDER — IPRATROPIUM BROMIDE AND ALBUTEROL SULFATE 2.5; .5 MG/3ML; MG/3ML
3 SOLUTION RESPIRATORY (INHALATION) ONCE
Status: COMPLETED | OUTPATIENT
Start: 2022-03-20 | End: 2022-03-20

## 2022-03-20 RX ADMIN — IPRATROPIUM BROMIDE AND ALBUTEROL SULFATE 3 AMPULE: 2.5; .5 SOLUTION RESPIRATORY (INHALATION) at 02:57

## 2022-03-20 RX ADMIN — IOPAMIDOL 75 ML: 755 INJECTION, SOLUTION INTRAVENOUS at 01:54

## 2022-03-20 NOTE — ED PROVIDER NOTES
61-year-old female with history of sarcoidosis presenting for voice hoarseness. Patient states she helped a friend move Thursday evening and got home late and that there was a chill in the air. She woke up with what feels like swelling in her throat and a hoarse voice. Symptoms have been moderate and persistent, no exacerbating or relieving factors. Patient states she tried using inhalers at home and took prednisone about 3 hours ago, with no relief. She complains of chest discomfort in her right chest where she had a port placed 2 weeks ago. She states she feels tubing moving when she moves her arm and that it is uncomfortable. She complains of pain when she cannot get enough air in. She denies any fevers or sore throat. She denies any difficulty swallowing. Review of Systems   Constitutional: Negative for chills and fever. HENT: Positive for voice change. Negative for congestion, sore throat and trouble swallowing. Respiratory: Positive for chest tightness. Negative for shortness of breath. Cardiovascular: Negative for chest pain. Gastrointestinal: Negative for abdominal pain and nausea. Musculoskeletal: Negative for neck pain. Skin: Negative for wound. Neurological: Negative for headaches. All other systems reviewed and are negative. Physical Exam  Constitutional:       Appearance: She is not ill-appearing. HENT:      Head: Normocephalic and atraumatic. Mouth/Throat:      Mouth: Mucous membranes are moist.      Pharynx: Oropharynx is clear. No pharyngeal swelling. Neck:      Comments: Prominent thyroid, nontender to palpation  Cardiovascular:      Rate and Rhythm: Normal rate and regular rhythm. Pulmonary:      Effort: Pulmonary effort is normal.      Breath sounds: Normal breath sounds. Chest:      Comments: Port insertion site noted right chest, no surrounding erythema or purulent discharge  Musculoskeletal:      Right lower leg: No edema.       Left lower leg: No edema. Skin:     General: Skin is warm and dry. Neurological:      General: No focal deficit present. Mental Status: She is alert. Psychiatric:         Mood and Affect: Mood normal.         Behavior: Behavior normal.          Procedures     MDM  Number of Diagnoses or Management Options  Laryngitis  Diagnosis management comments: 78-year-old female presenting for hoarse voice. No stridor or respiratory distress presentation. CT soft tissue neck was unremarkable. Work-up unremarkable. Patient was given breathing treatment with`relief of chest comfort. On reevaluation, patient stated that she had a throat doctor who had found polyps and was going to remove them. She states she will follow up with her lung doctor and her throat doctor. At this time, she is felt stable for discharge outpatient follow-up. She was discharged home with instructions to follow-up with specialist.       ED Course as of 03/21/22 0524   Sat Mar 19, 2022   [id number] ATTENDING PROVIDER ATTESTATION:     I have personally performed and/or participated in the history, exam, medical decision making, and procedures and agree with all pertinent clinical information unless otherwise noted. I have also reviewed and agree with the past medical, family and social history unless otherwise noted. I have discussed this patient in detail with the resident, and provided the instruction and education regarding the patient. My findings/plan:   68-year-old female with history of sarcoidosis is presenting for evaluation of difficulty getting in breath and change in voice. Patient notes that she was helping clean a friend's house a couple days ago and then yesterday started having difficulty with speaking. States it did not improve today so she presented for further evaluation treatment. She denies any chest discomfort, congestion, cough. She has tried cough drops as well as prednisone without any improvement.   She states she does not feel short of breath just hard to take a deep breath in. On exam she is sitting up in bed in no acute distressHeart regular rate and rhythmLungs good auscultation bilaterally anteriorly. Plan for labs, imaging, supportive care. [BB]      ED Course User Index  [BB] Jose Reilly DO               ED Course as of 03/21/22 0525   Sat Mar 19, 2022   1916 ATTENDING PROVIDER ATTESTATION:     I have personally performed and/or participated in the history, exam, medical decision making, and procedures and agree with all pertinent clinical information unless otherwise noted. I have also reviewed and agree with the past medical, family and social history unless otherwise noted. I have discussed this patient in detail with the resident, and provided the instruction and education regarding the patient. My findings/plan:   78-year-old female with history of sarcoidosis is presenting for evaluation of difficulty getting in breath and change in voice. Patient notes that she was helping clean a friend's house a couple days ago and then yesterday started having difficulty with speaking. States it did not improve today so she presented for further evaluation treatment. She denies any chest discomfort, congestion, cough. She has tried cough drops as well as prednisone without any improvement. She states she does not feel short of breath just hard to take a deep breath in. On exam she is sitting up in bed in no acute distressHeart regular rate and rhythmLungs good auscultation bilaterally anteriorly. Plan for labs, imaging, supportive care. [BB]      ED Course User Index  [BB] Jose Reilly DO       --------------------------------------------- PAST HISTORY ---------------------------------------------  Past Medical History:  has a past medical history of Arrhythmia, Arthritis, Hepatitis B, Sarcoidosis, and Ulcer.     Past Surgical History:  has a past surgical history that includes Cholecystectomy; Ectopic pregnancy surgery; Foot surgery (Left); liver biopsy (2015); and Dental surgery. Social History:  reports that she has never smoked. She has never used smokeless tobacco. She reports that she does not drink alcohol and does not use drugs. Family History: family history includes Arthritis in her paternal grandfather; Diabetes in her maternal grandmother and paternal grandmother; Stroke in her father. The patients home medications have been reviewed.     Allergies: Latex, Amitriptyline, Aspirin, Motrin [ibuprofen], and Tape [adhesive tape]    -------------------------------------------------- RESULTS -------------------------------------------------  Labs:  Results for orders placed or performed during the hospital encounter of 03/19/22   COVID-19, Rapid    Specimen: Nasopharyngeal Swab   Result Value Ref Range    SARS-CoV-2, NAAT Not Detected Not Detected   CBC with Auto Differential   Result Value Ref Range    WBC 3.6 (L) 4.5 - 11.5 E9/L    RBC 3.35 (L) 3.50 - 5.50 E12/L    Hemoglobin 10.3 (L) 11.5 - 15.5 g/dL    Hematocrit 32.5 (L) 34.0 - 48.0 %    MCV 97.0 80.0 - 99.9 fL    MCH 30.7 26.0 - 35.0 pg    MCHC 31.7 (L) 32.0 - 34.5 %    RDW 14.2 11.5 - 15.0 fL    Platelets 551 681 - 804 E9/L    MPV 9.8 7.0 - 12.0 fL    Neutrophils % 80.0 43.0 - 80.0 %    Lymphocytes % 14.0 (L) 20.0 - 42.0 %    Monocytes % 5.0 2.0 - 12.0 %    Eosinophils % 1.0 0.0 - 6.0 %    Basophils % 0.0 0.0 - 2.0 %    Neutrophils Absolute 2.88 1.80 - 7.30 E9/L    Lymphocytes Absolute 0.50 (L) 1.50 - 4.00 E9/L    Monocytes Absolute 0.18 0.10 - 0.95 E9/L    Eosinophils Absolute 0.04 (L) 0.05 - 0.50 E9/L    Basophils Absolute 0.00 0.00 - 0.20 E9/L    RBC Morphology Normal    Basic Metabolic Panel w/ Reflex to MG   Result Value Ref Range    Sodium 138 132 - 146 mmol/L    Potassium reflex Magnesium 3.8 3.5 - 5.0 mmol/L    Chloride 104 98 - 107 mmol/L    CO2 22 22 - 29 mmol/L    Anion Gap 12 7 - 16 mmol/L    Glucose 122 (H) 74 - 99 mg/dL    BUN 9 6 - 23 mg/dL    CREATININE 0.8 0.5 - 1.0 mg/dL    GFR Non-African American >60 >=60 mL/min/1.73    GFR African American >60     Calcium 9.0 8.6 - 10.2 mg/dL   Troponin   Result Value Ref Range    Troponin, High Sensitivity <6 0 - 9 ng/L   TSH   Result Value Ref Range    TSH 0.954 0.270 - 4.200 uIU/mL   EKG 12 Lead   Result Value Ref Range    Ventricular Rate 78 BPM    Atrial Rate 78 BPM    P-R Interval 178 ms    QRS Duration 90 ms    Q-T Interval 400 ms    QTc Calculation (Bazett) 456 ms    P Axis 50 degrees    R Axis -17 degrees    T Axis 48 degrees       Radiology:  CT SOFT TISSUE NECK W CONTRAST   Final Result   No acute findings. XR CHEST PORTABLE   Final Result   No acute findings. EKG: interpreted by me  Rate: 78  Rhythm: Sinus  Interpretation: No acute ST elevations or depressions, no acute T wave changes, normal intervals  Comparison: Stable compared to previous EKG on 1/17/2021    ------------------------- NURSING NOTES AND VITALS REVIEWED ---------------------------  Date / Time Roomed:  3/19/2022 10:16 PM  ED Bed Assignment:  07/07    The nursing notes within the ED encounter and vital signs as below have been reviewed. BP (!) 144/96   Pulse 88   Temp 98 °F (36.7 °C)   Resp 16   Ht 5' 2\" (1.575 m)   Wt 138 lb (62.6 kg)   LMP 11/12/2012 (LMP Unknown)   SpO2 99%   BMI 25.24 kg/m²   Oxygen Saturation Interpretation: Normal      ------------------------------------------ PROGRESS NOTES ------------------------------------------    I have spoken with the patient and discussed todays results, in addition to providing specific details for the plan of care and counseling regarding the diagnosis and prognosis. Their questions are answered at this time and they are agreeable with the plan. I discussed at length with them reasons for immediate return here for re evaluation.  They will followup with their pulmonologist by calling their office on Monday.      --------------------------------- ADDITIONAL PROVIDER NOTES ---------------------------------  At this time the patient is without objective evidence of an acute process requiring hospitalization or inpatient management. They have remained hemodynamically stable throughout their entire ED visit and are stable for discharge with outpatient follow-up. The plan has been discussed in detail and they are aware of the specific conditions for emergent return, as well as the importance of follow-up. Discharge Medication List as of 3/20/2022  3:56 AM          Diagnosis:  1. Laryngitis        Disposition:  Patient's disposition: Discharge to home  Patient's condition is stable.        Anastasia Arias MD  Resident  03/21/22 1419

## 2022-03-21 LAB
EKG ATRIAL RATE: 78 BPM
EKG P AXIS: 50 DEGREES
EKG P-R INTERVAL: 178 MS
EKG Q-T INTERVAL: 400 MS
EKG QRS DURATION: 90 MS
EKG QTC CALCULATION (BAZETT): 456 MS
EKG R AXIS: -17 DEGREES
EKG T AXIS: 48 DEGREES
EKG VENTRICULAR RATE: 78 BPM

## 2022-03-21 ASSESSMENT — ENCOUNTER SYMPTOMS
CHEST TIGHTNESS: 1
VOICE CHANGE: 1
ABDOMINAL PAIN: 0
NAUSEA: 0
SHORTNESS OF BREATH: 0
TROUBLE SWALLOWING: 0
SORE THROAT: 0

## 2022-12-12 ENCOUNTER — HOSPITAL ENCOUNTER (OUTPATIENT)
Dept: GENERAL RADIOLOGY | Age: 62
Discharge: HOME | End: 2022-12-14
Payer: MEDICAID

## 2022-12-12 ENCOUNTER — HOSPITAL ENCOUNTER (OUTPATIENT)
Age: 62
Discharge: HOME | End: 2022-12-14
Payer: MEDICAID

## 2022-12-12 DIAGNOSIS — D86.0 SARCOIDOSIS OF LUNG (HCC): ICD-10-CM

## 2022-12-12 PROCEDURE — 71046 X-RAY EXAM CHEST 2 VIEWS: CPT

## 2023-01-18 ENCOUNTER — OFFICE VISIT (OUTPATIENT)
Dept: CARDIOLOGY CLINIC | Age: 63
End: 2023-01-18

## 2023-01-18 VITALS
HEART RATE: 85 BPM | HEIGHT: 62 IN | RESPIRATION RATE: 16 BRPM | BODY MASS INDEX: 27.05 KG/M2 | SYSTOLIC BLOOD PRESSURE: 124 MMHG | WEIGHT: 147 LBS | DIASTOLIC BLOOD PRESSURE: 70 MMHG

## 2023-01-18 DIAGNOSIS — R07.89 ATYPICAL CHEST PAIN: Primary | ICD-10-CM

## 2023-01-18 RX ORDER — PREDNISONE 1 MG/1
TABLET ORAL
COMMUNITY
Start: 2022-12-05

## 2023-01-18 RX ORDER — HYDROCODONE BITARTRATE AND ACETAMINOPHEN 7.5; 325 MG/1; MG/1
TABLET ORAL
COMMUNITY
Start: 2022-12-27

## 2023-02-27 NOTE — PROGRESS NOTES
[facility] Cardiology consult  Dr. Alfreda Ibarra      Reason for Consult: Heart problems  Referring Physician: Kelley Hilliard MD     CHIEF COMPLAINT:   Chief Complaint   Patient presents with    Heart Problem     Annual        HISTORY OF PRESENT ILLNESS:   Patient is 57years old female who was referred to cardiology for evaluation  Patient denies any chest pain, no shortness of breath, no lightheadedness, no dizziness, no palpitations, no pedal edema, no PND, no orthopnea, no syncope, no presyncopal episodes. Past Medical History:   Diagnosis Date    Arrhythmia     IRREGULAR HEART BEAT    Arthritis     rhuemtoid    Hepatitis B     Sarcoidosis     Ulcer          Past Surgical History:   Procedure Laterality Date    CHOLECYSTECTOMY      DENTAL SURGERY      ECTOPIC PREGNANCY SURGERY      FOOT SURGERY Left     LIVER BIOPSY  2015         Current Outpatient Medications   Medication Sig Dispense Refill    HYDROcodone-acetaminophen (NORCO) 7.5-325 MG per tablet TAKE 1 TABLET BY MOUTH THREE TIMES DAILY AS NEEDED FOR PAIN      predniSONE (DELTASONE) 5 MG tablet TAKE 8 TABS BY MOUTH DAILY FOR 5 DAYS, THEN TAKE 7 TABS FOR 5 DAYS, THEN TAKE 6 TABS FOR 5 DAYS, THEN TAKE 5 TABS FOR 5 DAYS, THEN TAKE 4 TABS FOR 5 DAYS, THEN TAKE 3 TABS FOR 5 DAYS, THEN TAKE 2 TABS FOR 5 DAYS, THEN TAKE 1 TABLET DAILY THEREAFTER      ondansetron (ZOFRAN ODT) 4 MG disintegrating tablet Take 1 tablet by mouth every 8 hours as needed for Nausea or Vomiting (Patient not taking: Reported on 1/18/2023) 10 tablet 0    acetaminophen (TYLENOL) 500 MG tablet Take 1 tablet by mouth every 6 hours as needed for Pain (Patient not taking: Reported on 1/18/2023) 120 tablet 3    traMADol (ULTRAM) 50 MG tablet Take 50 mg by mouth every 8 hours as needed for Pain (Patient not taking: Reported on 1/18/2023)       No current facility-administered medications for this visit.          Allergies as of 01/18/2023 - Fully Reviewed 01/18/2023   Allergen Reaction Noted Latex Rash 12/08/2016    Amitriptyline Other (See Comments) 03/19/2022    Aspirin Other (See Comments) 12/08/2016    Motrin [ibuprofen] Nausea And Vomiting 12/08/2016    Tape [adhesive tape] Rash 12/08/2016       Social History     Socioeconomic History    Marital status:      Spouse name: Not on file    Number of children: 4    Years of education: Not on file    Highest education level: Not on file   Occupational History    Not on file   Tobacco Use    Smoking status: Never    Smokeless tobacco: Never   Vaping Use    Vaping Use: Never used   Substance and Sexual Activity    Alcohol use: No     Alcohol/week: 2.0 standard drinks     Types: 2 Cans of beer per week     Comment: SOCIALLY    Drug use: No    Sexual activity: Not on file   Other Topics Concern    Not on file   Social History Narrative    Not on file     Social Determinants of Health     Financial Resource Strain: Not on file   Food Insecurity: Not on file   Transportation Needs: Not on file   Physical Activity: Not on file   Stress: Not on file   Social Connections: Not on file   Intimate Partner Violence: Not on file   Housing Stability: Not on file       Family History   Problem Relation Age of Onset    Stroke Father     Diabetes Maternal Grandmother     Diabetes Paternal Grandmother     Arthritis Paternal Grandfather        REVIEW OF SYSTEMS:     CONSTITUTIONAL:  negative for  fevers, chills, sweats and fatigue  EYES:  negative for  double vision, blurred vision and blind spots  HEENT:  negative for  tinnitus, earaches, nasal congestion and epistaxis  RESPIRATORY:  negative for  dry cough, cough with sputum, dyspnea, wheezing and hemoptysis  CARDIOVASCULAR: as per HPI  GASTROINTESTINAL:  negative for nausea, vomiting, diarrhea, constipation, pruritus and jaundice  GENITOURINARY:  negative for frequency, dysuria, nocturia, urinary incontinence and hesitancy  HEMATOLOGIC/LYMPHATIC:  negative for easy bruising, bleeding, lymphadenopathy and petechiae  ALLERGIC/IMMUNOLOGIC:  negative for urticaria, hay fever and angioedema  ENDOCRINE:  negative for heat intolerance, cold intolerance, tremor, hair loss and diabetic symptoms including neither polyuria nor polydipsia nor blurred vision  MUSCULOSKELETAL:  negative for  myalgias, arthralgias, joint swelling, stiff joints and decreased range of motion  NEUROLOGICAL:  negative for memory problems, speech problems, visual disturbance, dysphagia, weakness and numbness      PHYSICAL EXAM:   CONSTITUTIONAL:  awake, alert, cooperative, no apparent distress, and appears stated age  EYES:  lids and lashes normal and pupils equal, round and reactive to light, anicteric sclerae  HEAD:  normocepalic, without obvious abnormality, atraumatic, pink, moist mucous membranes. NECK:  Supple, symmetrical, trachea midline, no adenopathy, thyroid symmetric, not enlarged and no tenderness, skin normal  HEMATOLOGIC/LYMPHATICS:  no cervical lymphadenopathy and no supraclavicular lymphadenopathy  LUNGS:  No increased work of breathing, good air exchange, clear to auscultation bilaterally, no crackles or wheezing  CARDIOVASCULAR:  Normal apical impulse, regular rate and rhythm, normal S1 and S2, no S3 or S4, and no murmur noted and no JVD, no carotid bruit, no pedal edema, good carotid upstroke bilaterally. ABDOMEN:  Soft, nontender, no masses, no hepatomegaly or splenomegaly, BS+  CHEST: nontender to palpation, expands symmetrically  MUSCULOSKELETAL:  No clubbing no cyanosis. there is no redness, warmth, or swelling of the joints  full range of motion noted  NEUROLOGIC:  Alert, awake,oriented x3, no focal neurologic deficit was appreciated  SKIN:  no bruising or bleeding, normal skin color, texture, turgor and no redness, warmth, or swelling        /70   Pulse 85   Resp 16   Ht 5' 2\" (1.575 m)   Wt 147 lb (66.7 kg)   LMP 11/12/2012 (LMP Unknown)   BMI 26.89 kg/m²     DATA:   I personally reviewed the visit EKG with the following interpretation: Sinus rhythm  ECHO: 2/9/2015, Summary   Normal left ventricular systolic function.   Ejection fraction is visually estimated at 60%.   Normal right ventricular size and function.   Normal left ventricular diastolic filling pattern for age.   Normal sized left atrium by volume index.   Trace-mild mitral regurgitation.   Mild tricuspid regurgitation.   PASP is estimated at 32 mmHg.   Mild pulmonic regurgitation.  Stress Test:   Angiography:   Cardiology Labs: BMP:    Lab Results   Component Value Date/Time     03/19/2022 10:50 PM    K 3.8 03/19/2022 10:50 PM     03/19/2022 10:50 PM    CO2 22 03/19/2022 10:50 PM    BUN 9 03/19/2022 10:50 PM    CREATININE 0.8 03/19/2022 10:50 PM     CMP:    Lab Results   Component Value Date/Time     03/19/2022 10:50 PM    K 3.8 03/19/2022 10:50 PM     03/19/2022 10:50 PM    CO2 22 03/19/2022 10:50 PM    BUN 9 03/19/2022 10:50 PM    CREATININE 0.8 03/19/2022 10:50 PM    PROT 9.3 01/17/2021 05:28 PM     CBC:    Lab Results   Component Value Date/Time    WBC 3.6 03/19/2022 10:50 PM    RBC 3.35 03/19/2022 10:50 PM    HGB 10.3 03/19/2022 10:50 PM    HCT 32.5 03/19/2022 10:50 PM    MCV 97.0 03/19/2022 10:50 PM    RDW 14.2 03/19/2022 10:50 PM     03/19/2022 10:50 PM     PT/INR:  No results found for: PTINR  PT/INR Warfarin:  No components found for: PTPATWAR, PTINRWAR  PTT:    Lab Results   Component Value Date/Time    APTT 31.6 09/08/2012 03:15 PM     PTT Heparin:  No components found for: APTTHEP  Magnesium:  No results found for: MG  TSH:    Lab Results   Component Value Date/Time    TSH 0.954 03/19/2022 10:50 PM     TROPONIN:  No components found for: TROP  BNP:  No results found for: BNP  FASTING LIPID PANEL:    Lab Results   Component Value Date/Time    CHOL 88 02/09/2015 05:10 AM    HDL 50 02/09/2015 05:10 AM    TRIG 53 02/09/2015 05:10 AM     No orders to display     I have personally reviewed the laboratory, cardiac diagnostic  and radiographic testing as outlined above:      IMPRESSION:  1. Mitral valve regurgitation:  2. Mild tricuspid valve regurgitation  3. Mild pulmonic insufficiency    RECOMMENDATIONS:   1. Continue current treatment  2. Follow-up with Dr. Wilman Alexandre as scheduled  3. Follow-up with Dr. Raina Rodriguez as needed    I have reviewed my findings and recommendations with patient    Thank you for the consult  Electronically signed by Luis Carlos Waggoner MD on 2/26/2023 at 11:27 PM      NOTE: This report was transcribed using voice recognition software.  Every effort was made to ensure accuracy; however, inadvertent computerized transcription errors may be present

## 2023-02-28 ENCOUNTER — HOSPITAL ENCOUNTER (EMERGENCY)
Age: 63
Discharge: HOME | End: 2023-02-28
Attending: EMERGENCY MEDICINE
Payer: MEDICAID

## 2023-02-28 ENCOUNTER — APPOINTMENT (OUTPATIENT)
Dept: CT IMAGING | Age: 63
End: 2023-02-28
Payer: MEDICAID

## 2023-02-28 ENCOUNTER — APPOINTMENT (OUTPATIENT)
Dept: GENERAL RADIOLOGY | Age: 63
End: 2023-02-28
Payer: MEDICAID

## 2023-02-28 VITALS
OXYGEN SATURATION: 99 % | DIASTOLIC BLOOD PRESSURE: 64 MMHG | TEMPERATURE: 98.3 F | RESPIRATION RATE: 24 BRPM | HEART RATE: 96 BPM | SYSTOLIC BLOOD PRESSURE: 133 MMHG

## 2023-02-28 DIAGNOSIS — J02.9 SORE THROAT: Primary | ICD-10-CM

## 2023-02-28 LAB
ALBUMIN SERPL-MCNC: 4.2 G/DL (ref 3.5–5.2)
ALP BLD-CCNC: 47 U/L (ref 35–104)
ALT SERPL-CCNC: 27 U/L (ref 0–32)
ANION GAP SERPL CALCULATED.3IONS-SCNC: 9 MMOL/L (ref 7–16)
AST SERPL-CCNC: 28 U/L (ref 0–31)
BASOPHILS ABSOLUTE: 0 E9/L (ref 0–0.2)
BASOPHILS RELATIVE PERCENT: 0 % (ref 0–2)
BILIRUB SERPL-MCNC: 0.3 MG/DL (ref 0–1.2)
BUN BLDV-MCNC: 9 MG/DL (ref 6–23)
CALCIUM SERPL-MCNC: 9.4 MG/DL (ref 8.6–10.2)
CHLORIDE BLD-SCNC: 102 MMOL/L (ref 98–107)
CO2: 24 MMOL/L (ref 22–29)
CREAT SERPL-MCNC: 0.8 MG/DL (ref 0.5–1)
EOSINOPHILS ABSOLUTE: 0 E9/L (ref 0.05–0.5)
EOSINOPHILS RELATIVE PERCENT: 0 % (ref 0–6)
GFR SERPL CREATININE-BSD FRML MDRD: >60 ML/MIN/1.73
GLUCOSE BLD-MCNC: 129 MG/DL (ref 74–99)
HCT VFR BLD CALC: 35.9 % (ref 34–48)
HEMOGLOBIN: 11.6 G/DL (ref 11.5–15.5)
IMMATURE GRANULOCYTES #: 0.01 E9/L
IMMATURE GRANULOCYTES %: 0.2 % (ref 0–5)
INFLUENZA A BY PCR: NOT DETECTED
INFLUENZA B BY PCR: NOT DETECTED
LYMPHOCYTES ABSOLUTE: 0.63 E9/L (ref 1.5–4)
LYMPHOCYTES RELATIVE PERCENT: 12.1 % (ref 20–42)
MCH RBC QN AUTO: 31.9 PG (ref 26–35)
MCHC RBC AUTO-ENTMCNC: 32.3 % (ref 32–34.5)
MCV RBC AUTO: 98.6 FL (ref 80–99.9)
MONOCYTES ABSOLUTE: 0.18 E9/L (ref 0.1–0.95)
MONOCYTES RELATIVE PERCENT: 3.5 % (ref 2–12)
NEUTROPHILS ABSOLUTE: 4.37 E9/L (ref 1.8–7.3)
NEUTROPHILS RELATIVE PERCENT: 84.2 % (ref 43–80)
PDW BLD-RTO: 12.8 FL (ref 11.5–15)
PLATELET # BLD: 228 E9/L (ref 130–450)
PMV BLD AUTO: 9.6 FL (ref 7–12)
POTASSIUM REFLEX MAGNESIUM: 4.2 MMOL/L (ref 3.5–5)
RBC # BLD: 3.64 E12/L (ref 3.5–5.5)
SARS-COV-2, NAAT: NOT DETECTED
SODIUM BLD-SCNC: 135 MMOL/L (ref 132–146)
STREP GRP A PCR: NEGATIVE
TOTAL PROTEIN: 7.9 G/DL (ref 6.4–8.3)
TROPONIN, HIGH SENSITIVITY: <6 NG/L (ref 0–9)
WBC # BLD: 5.2 E9/L (ref 4.5–11.5)

## 2023-02-28 PROCEDURE — 71046 X-RAY EXAM CHEST 2 VIEWS: CPT

## 2023-02-28 PROCEDURE — 74176 CT ABD & PELVIS W/O CONTRAST: CPT

## 2023-02-28 PROCEDURE — 87880 STREP A ASSAY W/OPTIC: CPT

## 2023-02-28 PROCEDURE — 87502 INFLUENZA DNA AMP PROBE: CPT

## 2023-02-28 PROCEDURE — 93005 ELECTROCARDIOGRAM TRACING: CPT | Performed by: PHYSICIAN ASSISTANT

## 2023-02-28 PROCEDURE — 87635 SARS-COV-2 COVID-19 AMP PRB: CPT

## 2023-02-28 PROCEDURE — 99285 EMERGENCY DEPT VISIT HI MDM: CPT

## 2023-02-28 PROCEDURE — 84484 ASSAY OF TROPONIN QUANT: CPT

## 2023-02-28 PROCEDURE — 80053 COMPREHEN METABOLIC PANEL: CPT

## 2023-02-28 PROCEDURE — 85025 COMPLETE CBC W/AUTO DIFF WBC: CPT

## 2023-02-28 RX ORDER — PREGABALIN 150 MG/1
150 CAPSULE ORAL 3 TIMES DAILY
COMMUNITY

## 2023-02-28 NOTE — ED NOTES
Department of Emergency Medicine  FIRST PROVIDER TRIAGE NOTE             Independent MLP           2/28/23  6:22 PM EST    Date of Encounter: 2/28/23   MRN: [id number]      HPI: Shashank Milton is a 57 y.o. female who presents to the ED for Shortness of Breath (Pt states that she has a swollen lymph node that is causing her to become sob and have cp) and Chest Pain   chest pain sore throat ear pain swollen lymph node     ROS: Negative for Suicidal ideation or Homicidal Ideation. PE: Gen Appearance/Constitutional: alert     Initial Plan of Care: All treatment areas with department are currently occupied. Plan to order/Initiate the following while awaiting opening in ED: labs, EKG, and imaging studies.   Initiate Treatment-Testing, Proceed toTreatment Area When Bed Available for ED Attending/MLP to Continue Care    Electronically signed by JAY Nielson   DD: 2/28/23      JAY Nielson  02/28/23 [id number]

## 2023-03-01 LAB
EKG ATRIAL RATE: 82 BPM
EKG P AXIS: 54 DEGREES
EKG P-R INTERVAL: 164 MS
EKG Q-T INTERVAL: 386 MS
EKG QRS DURATION: 78 MS
EKG QTC CALCULATION (BAZETT): 450 MS
EKG R AXIS: -24 DEGREES
EKG T AXIS: 48 DEGREES
EKG VENTRICULAR RATE: 82 BPM

## 2023-03-01 NOTE — ED NOTES
Patient walked out of ER unhappy with what the doctor had said and left without receiving discharge paperwork and education.  Charge nurse notified     Randell Jones RN  02/28/23 3110

## 2023-03-01 NOTE — ED PROVIDER NOTES
[address]        Pt Name: Ari Akers  MRN: [id number]  [place] 1960  Date of evaluation: 2/28/2023  Provider: Annabel Hanks MD  PCP: Aguilar Corley MD  Note Started: 8:41 PM EST 2/28/23    CHIEF COMPLAINT       Chief Complaint   Patient presents with    Shortness of Breath     Pt states that she has a swollen lymph node that is causing her to become sob and have cp    Chest Pain       HISTORY OF PRESENT ILLNESS: 1 or more Elements   History From: patient    Limitations to history : None    Ari Akers is a 57 y.o. female who presents for a left swollen lymph node as well as shortness of breath and chest pain that started last night. She denies a sore throat but states she has some pain with swallowing. She states her chest pain is left-sided, nonradiating, constant, achy, 7/10 in severity. She does have a history of sarcoidosis however denies any significant cardiac history she states she was last on IVIG in January 2023 stopped. She endorses associated nausea every morning for 5 days. Denies fever, chills, abdominal pain, vomiting, diarrhea, constipation, dysuria, rhinorrhea. Nursing Notes were all reviewed and agreed with or any disagreements were addressed in the HPI. REVIEW OF EXTERNAL NOTE :       Patient's last stress test on 2/8/2015 shows normal wall motion study with an EF of 62% no evidence of stress-induced ischemia, infarct, scar. REVIEW OF SYSTEMS :           Positives and Pertinent negatives as per HPI.      SURGICAL HISTORY     Past Surgical History:   Procedure Laterality Date    CHOLECYSTECTOMY      DENTAL SURGERY      ECTOPIC PREGNANCY SURGERY      FOOT SURGERY Left     LIVER BIOPSY  2015       [address]       Discharge Medication List as of 2/28/2023 11:27 PM        CONTINUE these medications which have NOT CHANGED    Details   pregabalin ([facility]) 150 MG capsule Take 150 mg by mouth 3 times daily. Historical Med      HYDROcodone-acetaminophen (NORCO) 7.5-325 MG per tablet TAKE 1 TABLET BY MOUTH THREE TIMES DAILY AS NEEDED FOR PAINHistorical Med      predniSONE (DELTASONE) 5 MG tablet TAKE 8 TABS BY MOUTH DAILY FOR 5 DAYS, THEN TAKE 7 TABS FOR 5 DAYS, THEN TAKE 6 TABS FOR 5 DAYS, THEN TAKE 5 TABS FOR 5 DAYS, THEN TAKE 4 TABS FOR 5 DAYS, THEN TAKE 3 TABS FOR 5 DAYS, THEN TAKE 2 TABS FOR 5 DAYS, THEN TAKE 1 TABLET DAILY THEREAFTERHisto rical Med      ondansetron (ZOFRAN ODT) 4 MG disintegrating tablet Take 1 tablet by mouth every 8 hours as needed for Nausea or Vomiting, Disp-10 tablet,R-0Print      acetaminophen (TYLENOL) 500 MG tablet Take 1 tablet by mouth every 6 hours as needed for Pain, Disp-120 tablet, R-3Print      traMADol (ULTRAM) 50 MG tablet Take 50 mg by mouth every 8 hours as needed for Pain             ALLERGIES     Latex, Amitriptyline, Aspirin, Motrin [ibuprofen], and Tape [adhesive tape]    FAMILYHISTORY       Family History   Problem Relation Age of Onset    Stroke Father     Diabetes Maternal Grandmother     Diabetes Paternal Grandmother     Arthritis Paternal Grandfather         SOCIAL HISTORY       Social History     Tobacco Use    Smoking status: Never    Smokeless tobacco: Never   Vaping Use    Vaping Use: Never used   Substance Use Topics    Alcohol use: No     Alcohol/week: 2.0 standard drinks     Types: 2 Cans of beer per week     Comment: SOCIALLY    Drug use: No       SCREENINGS        Tomy Coma Scale  Eye Opening: Spontaneous  Best Verbal Response: Oriented  Best Motor Response: Obeys commands  [place] Coma Scale Score: 15                CIWA Assessment  BP: 133/64  Heart Rate: 96           PHYSICAL EXAM  1 or more Elements     ED Triage Vitals   BP Temp Temp Source Heart Rate Resp SpO2 Height Weight   02/28/23 1823 02/28/23 1754 02/28/23 1754 02/28/23 1754 02/28/23 1754 02/28/23 1754 -- --   133/64 98.3 °F (36.8 °C) Oral 96 24 99 %         Constitutional/General: Alert and oriented x3  Head: Normocephalic and atraumatic  Eyes: PERRL, EOMI, conjunctiva normal, sclera non icteric  ENT:  Oropharynx clear, handling secretions, no trismus, no asymmetry of the posterior oropharynx or uvular edema, no enlarged submandibular lymph nodes noted, no erythema of posterior oropharynx   Neck: Supple, full ROM, no stridor, no meningeal signs  Respiratory: Lungs clear to auscultation bilaterally, no wheezes, rales, or rhonchi. Not in respiratory distress  Cardiovascular:  Regular rate. Regular rhythm. No murmurs, no gallops, no rubs. 2+ distal pulses. Equal extremity pulses. GI:  Abdomen Soft, Non tender, Non distended. No rebound, guarding, or rigidity. No pulsatile masses. Musculoskeletal: Moves all extremities x 4. Warm and well perfused, no clubbing, no cyanosis, no edema. Capillary refill <3 seconds  Integument: skin warm and dry. No rashes. Neurologic: GCS 15, no focal deficits, symmetric strength 5/5 in the upper and lower extremities bilaterally  Psychiatric: Normal Affect            DIAGNOSTIC RESULTS   LABS:    Labs Reviewed   CBC WITH AUTO DIFFERENTIAL - Abnormal; Notable for the following components:       Result Value    Neutrophils % 84.2 (*)     Lymphocytes % 12.1 (*)     Lymphocytes Absolute 0.63 (*)     Eosinophils Absolute 0.00 (*)     All other components within normal limits   COMPREHENSIVE METABOLIC PANEL W/ REFLEX TO MG FOR LOW K - Abnormal; Notable for the following components:    Glucose 129 (*)     All other components within normal limits   STREP SCREEN GROUP A THROAT   COVID-19, RAPID   RAPID INFLUENZA A/B ANTIGENS   TROPONIN       As interpreted by me, the above displayed labs are abnormal. All other labs obtained during this visit were within normal range or not returned as of this dictation. EKG Interpretation  Interpreted by emergency department physician, Andrei Lambert MD    EKG:   This EKG is signed and interpreted by me. Rate: 82  Rhythm: Sinus  Interpretation: Normal sinus rhythm, normal OR interval, normal QRS, normal QT interval, no acute ST or T wave changes  Comparison: stable as compared to patient's most recent EKG 1/18/23       RADIOLOGY:   Non-plain film images such as CT, Ultrasound and MRI are read by the radiologist. Plain radiographic images are visualized and preliminarily interpreted by the ED Provider with the below findings:    CXR shows no pleural effusions, PTX, consolidations    Interpretation per the Radiologist below, if available at the time of this note:    CT ABDOMEN PELVIS WO CONTRAST   Final Result   1. No pneumoperitoneum, with note of the proximal colon being   subdiaphragmatic ventral to the liver. 2. No renal calculus or renal or bowel obstruction. XR CHEST (2 VW)   Final Result   No acute process. Stable exam.           XR CHEST (2 VW)    Result Date: 2/28/2023  EXAMINATION: TWO XRAY VIEWS OF THE CHEST 2/28/2023 6:51 pm COMPARISON: Chest two view, 12/12/2022; chest one view, 03/19/2022 HISTORY: ORDERING SYSTEM PROVIDED HISTORY: cp TECHNOLOGIST PROVIDED HISTORY: Reason for exam:->cp FINDINGS: Lines, tubes, and devices: Left subclavian MediPort catheter position is stable. Lungs and pleura: No focal consolidation. No pleural effusion. No pneumothorax. Cardiomediastinal silhouette: The mediastinum is stable. The heart size is normal. Bones and soft tissues: There are surgical clips in the right upper quadrant. No acute process. Stable exam.       No results found. PROCEDURES   Unless otherwise noted below, none          CRITICAL CARE TIME (.cct)       PAST MEDICAL HISTORY/Chronic Conditions Affecting Care      has a past medical history of Arrhythmia, Arthritis, Hepatitis B, Sarcoidosis, and Ulcer.      EMERGENCY DEPARTMENT COURSE    Vitals:    Vitals:    02/28/23 1754 02/28/23 1823   BP:  133/64   Pulse: 96    Resp: 24    Temp: 98.3 °F (36.8 °C)    TempSrc: Oral    SpO2: 99%        Patient was given the following medications:  Medications - No data to display        Is this patient to be included in the SEP-1 Core Measure due to severe sepsis or septic shock? No   Exclusion criteria - the patient is NOT to be included for SEP-1 Core Measure due to: Infection is not suspected        Medical Decision Making/Differential Diagnosis:    CC/HPI Summary, Social Determinants of health, Records Reviewed, DDx, testing done/not done, ED Course, Reassessment, disposition considerations/shared decision making with patient, consults, disposition:      ED Course as of 03/02/23 0047   Tue Feb 28, 2023 2314 Nursing updated me that the patient has left the emergency department without receiving her discharge paperwork [KM]      ED Course User Index  [KM] Lillie Santiago MD       She is a 57-year-old female with a history of sarcoidosis presenting for her swollen left submandibular lymph node as well as pain with swallowing and severe shortness of breath/chest pain that started last night. Differentials include but not limited to strep pharyngitis, viral pulmonary etiology, ACS, pneumonia, PTX. The patient's vitals are stable. Her CBC and CMP were unremarkable. This is less likely ACS given unremarkable EKG and troponin less than 6 with pain starting yesterday. Her chest x-ray shows no pneumothorax, consolidations, pleural effusions however there is questionable air under the right diaphragm. A CT abdomen was ordered for further assessment and showed no pneumoperitoneum with note of proximal colon being said diaphragmatic ventral to the liver with no signs of renal calculus or renal or bowel obstruction. On reexamination the patient appears well and her vitals remained stable. The patient is asking for penicillin to treat her symptoms, however I explained that penicillin is not indicated and not necessary for her complaints today.  Labs and imaging were discussed with the patient. The plan for discharge with follow up with their PCP was discussed with the patient and she agrees with the plan. Return precautions were given. The patient left prior to receiving her discharge paperwork. EKG is ordered to have documentation of patient's current rhythm, and to rule out any obvious acute cardiac illnesses such as ACS. Additionally, QT interval may be of use in decision making regarding any medications administered here in the ED. CBC is ordered to evaluate for any signs of infection or inflammation by obtaining a WBC count, or any signs of acute anemia by interpreting hemoglobin. CMP was ordered to evaluate for any electrolyte imbalances, kidney function, or any elevations in anion gap. Troponin ordered to evaluate for possible cardiac etiology of symptoms including but not limited to STEMI or NSTEMI. Viral swabs are ordered to evaluate possible viral etiology of symptoms. Chest x-ray is ordered to evaluate for any possible signs of pneumonia, pleural effusions, cardiomegaly, pneumothorax, atelectasis, rib or sternal abnormalities including fractures. A CT abdomen with IV contrast was ordered to evaluate for, but without limitation, constipation, small bowel obstruction, bowel ischemia, pneumoperitoneum, diverticulitis, cholecystitis, appendicitis, perforation. CONSULTS: (Who and What was discussed)  None      I am the Primary Clinician of Record. FINAL IMPRESSION      1.  Sore throat          DISPOSITION/PLAN     DISPOSITION Decision To Discharge 02/28/2023 11:14:41 PM      PATIENT REFERRED TO:  Tanya Paget, MD  [address] [place] [address] [phone]    Schedule an appointment as soon as possible for a visit in 2 days      DISCHARGE MEDICATIONS:  Discharge Medication List as of 2/28/2023 11:27 PM          DISCONTINUED MEDICATIONS:  Discharge Medication List as of 2/28/2023 11:27 PM                 (Please note that portions of this note were completed with a voice recognition program.  Efforts were made to edit the dictations but occasionally words are mis-transcribed.)    To Abraham MD (electronically signed)            To Abraham MD  Resident  03/02/23 1480

## 2024-08-27 ENCOUNTER — HOSPITAL ENCOUNTER (EMERGENCY)
Age: 64
Discharge: LEFT BEFORE DISCHARGE | End: 2024-08-27

## 2024-08-27 VITALS — TEMPERATURE: 97 F | HEART RATE: 75 BPM

## 2025-06-29 ENCOUNTER — HOSPITAL ENCOUNTER (EMERGENCY)
Age: 65
Discharge: HOME | End: 2025-06-29
Attending: EMERGENCY MEDICINE
Payer: MEDICAID

## 2025-06-29 ENCOUNTER — APPOINTMENT (OUTPATIENT)
Dept: CT IMAGING | Age: 65
End: 2025-06-29
Payer: MEDICAID

## 2025-06-29 ENCOUNTER — APPOINTMENT (OUTPATIENT)
Dept: ULTRASOUND IMAGING | Age: 65
End: 2025-06-29
Payer: MEDICAID

## 2025-06-29 VITALS
HEIGHT: 62 IN | BODY MASS INDEX: 24.84 KG/M2 | OXYGEN SATURATION: 100 % | TEMPERATURE: 97.9 F | HEART RATE: 68 BPM | SYSTOLIC BLOOD PRESSURE: 122 MMHG | DIASTOLIC BLOOD PRESSURE: 64 MMHG | RESPIRATION RATE: 19 BRPM | WEIGHT: 135 LBS

## 2025-06-29 DIAGNOSIS — D64.9 ANEMIA, UNSPECIFIED TYPE: ICD-10-CM

## 2025-06-29 DIAGNOSIS — D86.9 SARCOIDOSIS: ICD-10-CM

## 2025-06-29 DIAGNOSIS — R07.9 CHEST PAIN, UNSPECIFIED TYPE: Primary | ICD-10-CM

## 2025-06-29 LAB
ALBUMIN SERPL-MCNC: 3.1 G/DL (ref 3.5–5.2)
ALP SERPL-CCNC: 42 U/L (ref 35–104)
ALT SERPL-CCNC: 31 U/L (ref 0–35)
ANION GAP SERPL CALCULATED.3IONS-SCNC: 9 MMOL/L (ref 7–16)
AST SERPL-CCNC: 76 U/L (ref 0–35)
BACTERIA URNS QL MICRO: ABNORMAL
BASOPHILS # BLD: 0.01 K/UL (ref 0–0.2)
BASOPHILS NFR BLD: 0 % (ref 0–2)
BILIRUB SERPL-MCNC: 0.3 MG/DL (ref 0–1.2)
BILIRUB UR QL STRIP: NEGATIVE
BUN SERPL-MCNC: 6 MG/DL (ref 8–23)
CALCIUM SERPL-MCNC: 8.1 MG/DL (ref 8.8–10.2)
CHLORIDE SERPL-SCNC: 105 MMOL/L (ref 98–107)
CLARITY UR: CLEAR
CO2 SERPL-SCNC: 22 MMOL/L (ref 22–29)
COLOR UR: YELLOW
CREAT SERPL-MCNC: 0.7 MG/DL (ref 0.5–1)
EOSINOPHIL # BLD: 0.12 K/UL (ref 0.05–0.5)
EOSINOPHILS RELATIVE PERCENT: 3 % (ref 0–6)
EPI CELLS #/AREA URNS HPF: ABNORMAL /HPF
ERYTHROCYTE [DISTWIDTH] IN BLOOD BY AUTOMATED COUNT: 15.3 % (ref 11.5–15)
GFR, ESTIMATED: >90 ML/MIN/1.73M2
GLUCOSE SERPL-MCNC: 128 MG/DL (ref 74–99)
GLUCOSE UR STRIP-MCNC: NEGATIVE MG/DL
HCT VFR BLD AUTO: 25.5 % (ref 34–48)
HGB BLD-MCNC: 8.5 G/DL (ref 11.5–15.5)
HGB UR QL STRIP.AUTO: NEGATIVE
IMM GRANULOCYTES # BLD AUTO: <0.03 K/UL (ref 0–0.58)
IMM GRANULOCYTES NFR BLD: 0 % (ref 0–5)
KETONES UR STRIP-MCNC: NEGATIVE MG/DL
LEUKOCYTE ESTERASE UR QL STRIP: ABNORMAL
LYMPHOCYTES NFR BLD: 1.63 K/UL (ref 1.5–4)
LYMPHOCYTES RELATIVE PERCENT: 37 % (ref 20–42)
MCH RBC QN AUTO: 32.7 PG (ref 26–35)
MCHC RBC AUTO-ENTMCNC: 33.3 G/DL (ref 32–34.5)
MCV RBC AUTO: 98.1 FL (ref 80–99.9)
MONOCYTES NFR BLD: 0.33 K/UL (ref 0.1–0.95)
MONOCYTES NFR BLD: 8 % (ref 2–12)
NEUTROPHILS NFR BLD: 52 % (ref 43–80)
NEUTS SEG NFR BLD: 2.28 K/UL (ref 1.8–7.3)
NITRITE UR QL STRIP: NEGATIVE
PH UR STRIP: 7 [PH] (ref 5–8)
PLATELET, FLUORESCENCE: 148 K/UL (ref 130–450)
PMV BLD AUTO: 9.4 FL (ref 7–12)
POTASSIUM SERPL-SCNC: 3.2 MMOL/L (ref 3.5–5.1)
PROT SERPL-MCNC: 7.1 G/DL (ref 6.4–8.3)
PROT UR STRIP-MCNC: NEGATIVE MG/DL
RBC # BLD AUTO: 2.6 M/UL (ref 3.5–5.5)
RBC #/AREA URNS HPF: ABNORMAL /HPF
SODIUM SERPL-SCNC: 136 MMOL/L (ref 136–145)
SP GR UR STRIP: <1.005 (ref 1–1.03)
TROPONIN I SERPL HS-MCNC: 6 NG/L (ref 0–14)
TROPONIN I SERPL HS-MCNC: <6 NG/L (ref 0–14)
UROBILINOGEN UR STRIP-ACNC: 0.2 EU/DL (ref 0–1)
WBC #/AREA URNS HPF: ABNORMAL /HPF
WBC OTHER # BLD: 4.4 K/UL (ref 4.5–11.5)

## 2025-06-29 PROCEDURE — 6370000000 HC RX 637 (ALT 250 FOR IP): Performed by: EMERGENCY MEDICINE

## 2025-06-29 PROCEDURE — 6360000004 HC RX CONTRAST MEDICATION: Performed by: RADIOLOGY

## 2025-06-29 PROCEDURE — 87086 URINE CULTURE/COLONY COUNT: CPT

## 2025-06-29 PROCEDURE — 70450 CT HEAD/BRAIN W/O DYE: CPT

## 2025-06-29 PROCEDURE — 85025 COMPLETE CBC W/AUTO DIFF WBC: CPT

## 2025-06-29 PROCEDURE — 94640 AIRWAY INHALATION TREATMENT: CPT

## 2025-06-29 PROCEDURE — 71275 CT ANGIOGRAPHY CHEST: CPT

## 2025-06-29 PROCEDURE — 96361 HYDRATE IV INFUSION ADD-ON: CPT

## 2025-06-29 PROCEDURE — 93971 EXTREMITY STUDY: CPT

## 2025-06-29 PROCEDURE — 81001 URINALYSIS AUTO W/SCOPE: CPT

## 2025-06-29 PROCEDURE — 96374 THER/PROPH/DIAG INJ IV PUSH: CPT

## 2025-06-29 PROCEDURE — 6360000002 HC RX W HCPCS: Performed by: EMERGENCY MEDICINE

## 2025-06-29 PROCEDURE — 99285 EMERGENCY DEPT VISIT HI MDM: CPT

## 2025-06-29 PROCEDURE — 2580000003 HC RX 258: Performed by: EMERGENCY MEDICINE

## 2025-06-29 PROCEDURE — 80053 COMPREHEN METABOLIC PANEL: CPT

## 2025-06-29 PROCEDURE — 84484 ASSAY OF TROPONIN QUANT: CPT

## 2025-06-29 PROCEDURE — 93005 ELECTROCARDIOGRAM TRACING: CPT | Performed by: EMERGENCY MEDICINE

## 2025-06-29 RX ORDER — IOPAMIDOL 755 MG/ML
75 INJECTION, SOLUTION INTRAVASCULAR
Status: COMPLETED | OUTPATIENT
Start: 2025-06-29 | End: 2025-06-29

## 2025-06-29 RX ORDER — POTASSIUM CHLORIDE 1500 MG/1
40 TABLET, EXTENDED RELEASE ORAL ONCE
Status: COMPLETED | OUTPATIENT
Start: 2025-06-29 | End: 2025-06-29

## 2025-06-29 RX ORDER — 0.9 % SODIUM CHLORIDE 0.9 %
1000 INTRAVENOUS SOLUTION INTRAVENOUS ONCE
Status: COMPLETED | OUTPATIENT
Start: 2025-06-29 | End: 2025-06-29

## 2025-06-29 RX ORDER — LIDOCAINE 50 MG/G
1 PATCH TOPICAL DAILY
Qty: 10 PATCH | Refills: 0 | Status: SHIPPED | OUTPATIENT
Start: 2025-06-29 | End: 2025-07-09

## 2025-06-29 RX ORDER — IPRATROPIUM BROMIDE AND ALBUTEROL SULFATE 2.5; .5 MG/3ML; MG/3ML
1 SOLUTION RESPIRATORY (INHALATION) ONCE
Status: COMPLETED | OUTPATIENT
Start: 2025-06-29 | End: 2025-06-29

## 2025-06-29 RX ORDER — KETOROLAC TROMETHAMINE 30 MG/ML
15 INJECTION, SOLUTION INTRAMUSCULAR; INTRAVENOUS ONCE
Status: COMPLETED | OUTPATIENT
Start: 2025-06-29 | End: 2025-06-29

## 2025-06-29 RX ADMIN — IOPAMIDOL 75 ML: 755 INJECTION, SOLUTION INTRAVENOUS at 18:58

## 2025-06-29 RX ADMIN — SODIUM CHLORIDE 1000 ML: 0.9 INJECTION, SOLUTION INTRAVENOUS at 17:43

## 2025-06-29 RX ADMIN — KETOROLAC TROMETHAMINE 15 MG: 30 INJECTION, SOLUTION INTRAMUSCULAR at 17:44

## 2025-06-29 RX ADMIN — IPRATROPIUM BROMIDE AND ALBUTEROL SULFATE 1 DOSE: .5; 3 SOLUTION RESPIRATORY (INHALATION) at 16:32

## 2025-06-29 RX ADMIN — POTASSIUM CHLORIDE 40 MEQ: 1500 TABLET, EXTENDED RELEASE ORAL at 19:32

## 2025-06-29 NOTE — ED PROVIDER NOTES
ED PROVIDER NOTE    Chief Complaint   Patient presents with    Headache     Since had infusion for sarcoidosis last week    Chest Pain     Swollen legs, and sob    Dysuria       HPI:  6/29/25,   Time: 4:27 PM EDT       Jacque Juarez is a 65 y.o. female presenting to the ED for chest pain, headache, shortness of breath.  Ongoing over the last 2 weeks, significantly worsened over the last 2 days.  Patient has history of sarcoidosis.  She is on infusions every 2 weeks.  She reports pain in the left side of her chest, sharp, worse with inspiration.  Associated dyspnea.  No nausea, vomiting, diaphoresis.  No fever or chills.  She did have some flulike symptoms 2 weeks ago which have since resolved.  No associated abdominal pain.  No black or bloody stools.  She does note some pain and swelling of her left leg and pain in the left calf and thigh.  No known history of VTE.  No recent hospitalizations, long travel, hormonal medication use, syncope, hemoptysis.  Denies drug or alcohol use.  Does not smoke.  No known history of CAD.    Chart review: hx of RA, sarcoidosis, PUD, cocaine abuse    Reviewed outpatient pulmonology progress note from 2/28/25 by Dr. Valdes:  Dx sarcoidosis, had intolerable side effects from infliximab, now on humira every other week. Also on IVIG q2 weeks. Also on arava, plaquenil, prednisone 5mg daily.    Review of Systems:     Review of Systems  Pertinent positives and negatives as stated in HPI     --------------------------------------------- PAST HISTORY ---------------------------------------------  Past Medical History:   Past Medical History:   Diagnosis Date    Arrhythmia     IRREGULAR HEART BEAT    Arthritis     rhuemtoid    Hepatitis B     Sarcoidosis     Ulcer        Past Surgical History:   Past Surgical History:   Procedure Laterality Date    CHOLECYSTECTOMY      DENTAL SURGERY      ECTOPIC PREGNANCY SURGERY      FOOT SURGERY Left     LIVER BIOPSY  2015       Social History:

## 2025-06-30 ENCOUNTER — HOSPITAL ENCOUNTER (EMERGENCY)
Age: 65
Discharge: HOME | End: 2025-07-01
Attending: STUDENT IN AN ORGANIZED HEALTH CARE EDUCATION/TRAINING PROGRAM
Payer: MEDICAID

## 2025-06-30 VITALS
DIASTOLIC BLOOD PRESSURE: 83 MMHG | RESPIRATION RATE: 20 BRPM | SYSTOLIC BLOOD PRESSURE: 127 MMHG | TEMPERATURE: 98.1 F | OXYGEN SATURATION: 100 % | HEART RATE: 78 BPM

## 2025-06-30 DIAGNOSIS — R06.02 SHORTNESS OF BREATH: Primary | ICD-10-CM

## 2025-06-30 DIAGNOSIS — M79.89 LEG SWELLING: ICD-10-CM

## 2025-06-30 LAB
ALBUMIN SERPL-MCNC: 3.3 G/DL (ref 3.5–5.2)
ALP SERPL-CCNC: 45 U/L (ref 35–104)
ALT SERPL-CCNC: 36 U/L (ref 0–35)
ANION GAP SERPL CALCULATED.3IONS-SCNC: 8 MMOL/L (ref 7–16)
AST SERPL-CCNC: 68 U/L (ref 0–35)
BASOPHILS # BLD: 0.01 K/UL (ref 0–0.2)
BASOPHILS NFR BLD: 0 % (ref 0–2)
BILIRUB SERPL-MCNC: 0.2 MG/DL (ref 0–1.2)
BNP SERPL-MCNC: 609 PG/ML (ref 0–125)
BUN SERPL-MCNC: 5 MG/DL (ref 8–23)
CALCIUM SERPL-MCNC: 8.5 MG/DL (ref 8.8–10.2)
CHLORIDE SERPL-SCNC: 106 MMOL/L (ref 98–107)
CO2 SERPL-SCNC: 25 MMOL/L (ref 22–29)
CREAT SERPL-MCNC: 0.7 MG/DL (ref 0.5–1)
EKG ATRIAL RATE: 80 BPM
EKG P AXIS: 78 DEGREES
EKG P-R INTERVAL: 178 MS
EKG Q-T INTERVAL: 386 MS
EKG QRS DURATION: 76 MS
EKG QTC CALCULATION (BAZETT): 445 MS
EKG R AXIS: -20 DEGREES
EKG T AXIS: 43 DEGREES
EKG VENTRICULAR RATE: 80 BPM
EOSINOPHIL # BLD: 0.1 K/UL (ref 0.05–0.5)
EOSINOPHILS RELATIVE PERCENT: 2 % (ref 0–6)
ERYTHROCYTE [DISTWIDTH] IN BLOOD BY AUTOMATED COUNT: 15.5 % (ref 11.5–15)
FLUAV RNA RESP QL NAA+PROBE: NOT DETECTED
FLUBV RNA RESP QL NAA+PROBE: NOT DETECTED
GFR, ESTIMATED: >90 ML/MIN/1.73M2
GLUCOSE SERPL-MCNC: 109 MG/DL (ref 74–99)
HCT VFR BLD AUTO: 25.8 % (ref 34–48)
HGB BLD-MCNC: 8.7 G/DL (ref 11.5–15.5)
IMM GRANULOCYTES # BLD AUTO: <0.03 K/UL (ref 0–0.58)
IMM GRANULOCYTES NFR BLD: 0 % (ref 0–5)
LYMPHOCYTES NFR BLD: 1.52 K/UL (ref 1.5–4)
LYMPHOCYTES RELATIVE PERCENT: 36 % (ref 20–42)
MCH RBC QN AUTO: 33 PG (ref 26–35)
MCHC RBC AUTO-ENTMCNC: 33.7 G/DL (ref 32–34.5)
MCV RBC AUTO: 97.7 FL (ref 80–99.9)
MICROORGANISM SPEC CULT: ABNORMAL
MONOCYTES NFR BLD: 0.29 K/UL (ref 0.1–0.95)
MONOCYTES NFR BLD: 7 % (ref 2–12)
NEUTROPHILS NFR BLD: 55 % (ref 43–80)
NEUTS SEG NFR BLD: 2.33 K/UL (ref 1.8–7.3)
PLATELET # BLD AUTO: 127 K/UL (ref 130–450)
PMV BLD AUTO: 9.9 FL (ref 7–12)
POTASSIUM SERPL-SCNC: 3.7 MMOL/L (ref 3.5–5.1)
PROT SERPL-MCNC: 7.7 G/DL (ref 6.4–8.3)
RBC # BLD AUTO: 2.64 M/UL (ref 3.5–5.5)
SARS-COV-2 RNA RESP QL NAA+PROBE: NOT DETECTED
SERVICE CMNT-IMP: ABNORMAL
SODIUM SERPL-SCNC: 139 MMOL/L (ref 136–145)
SOURCE: NORMAL
SPECIMEN DESCRIPTION: ABNORMAL
SPECIMEN DESCRIPTION: NORMAL
TROPONIN I SERPL HS-MCNC: 7 NG/L (ref 0–14)
WBC OTHER # BLD: 4.3 K/UL (ref 4.5–11.5)

## 2025-06-30 PROCEDURE — 85025 COMPLETE CBC W/AUTO DIFF WBC: CPT

## 2025-06-30 PROCEDURE — 87636 SARSCOV2 & INF A&B AMP PRB: CPT

## 2025-06-30 PROCEDURE — 96375 TX/PRO/DX INJ NEW DRUG ADDON: CPT

## 2025-06-30 PROCEDURE — 2500000003 HC RX 250 WO HCPCS: Performed by: STUDENT IN AN ORGANIZED HEALTH CARE EDUCATION/TRAINING PROGRAM

## 2025-06-30 PROCEDURE — 83880 ASSAY OF NATRIURETIC PEPTIDE: CPT

## 2025-06-30 PROCEDURE — 6370000000 HC RX 637 (ALT 250 FOR IP): Performed by: STUDENT IN AN ORGANIZED HEALTH CARE EDUCATION/TRAINING PROGRAM

## 2025-06-30 PROCEDURE — 96374 THER/PROPH/DIAG INJ IV PUSH: CPT

## 2025-06-30 PROCEDURE — 84484 ASSAY OF TROPONIN QUANT: CPT

## 2025-06-30 PROCEDURE — 93010 ELECTROCARDIOGRAM REPORT: CPT | Performed by: INTERNAL MEDICINE

## 2025-06-30 PROCEDURE — 93005 ELECTROCARDIOGRAM TRACING: CPT | Performed by: STUDENT IN AN ORGANIZED HEALTH CARE EDUCATION/TRAINING PROGRAM

## 2025-06-30 PROCEDURE — 94640 AIRWAY INHALATION TREATMENT: CPT

## 2025-06-30 PROCEDURE — 80053 COMPREHEN METABOLIC PANEL: CPT

## 2025-06-30 PROCEDURE — 6360000002 HC RX W HCPCS: Performed by: STUDENT IN AN ORGANIZED HEALTH CARE EDUCATION/TRAINING PROGRAM

## 2025-06-30 PROCEDURE — 99284 EMERGENCY DEPT VISIT MOD MDM: CPT

## 2025-06-30 RX ORDER — MORPHINE SULFATE 4 MG/ML
4 INJECTION, SOLUTION INTRAMUSCULAR; INTRAVENOUS ONCE
Refills: 0 | Status: COMPLETED | OUTPATIENT
Start: 2025-06-30 | End: 2025-06-30

## 2025-06-30 RX ORDER — IPRATROPIUM BROMIDE AND ALBUTEROL SULFATE 2.5; .5 MG/3ML; MG/3ML
3 SOLUTION RESPIRATORY (INHALATION) ONCE
Status: COMPLETED | OUTPATIENT
Start: 2025-06-30 | End: 2025-06-30

## 2025-06-30 RX ADMIN — MORPHINE SULFATE 4 MG: 4 INJECTION, SOLUTION INTRAMUSCULAR; INTRAVENOUS at 22:29

## 2025-06-30 RX ADMIN — METHYLPREDNISOLONE SODIUM SUCCINATE 125 MG: 125 INJECTION INTRAMUSCULAR; INTRAVENOUS at 22:23

## 2025-06-30 RX ADMIN — IPRATROPIUM BROMIDE AND ALBUTEROL SULFATE 3 DOSE: .5; 2.5 SOLUTION RESPIRATORY (INHALATION) at 20:42

## 2025-06-30 ASSESSMENT — PAIN SCALES - GENERAL: PAINLEVEL_OUTOF10: 8

## 2025-06-30 ASSESSMENT — LIFESTYLE VARIABLES
HOW MANY STANDARD DRINKS CONTAINING ALCOHOL DO YOU HAVE ON A TYPICAL DAY: PATIENT DOES NOT DRINK
HOW OFTEN DO YOU HAVE A DRINK CONTAINING ALCOHOL: NEVER

## 2025-06-30 ASSESSMENT — PAIN DESCRIPTION - LOCATION: LOCATION: GENERALIZED;FOOT

## 2025-07-01 LAB
EKG ATRIAL RATE: 82 BPM
EKG P AXIS: 44 DEGREES
EKG P-R INTERVAL: 182 MS
EKG Q-T INTERVAL: 382 MS
EKG QRS DURATION: 78 MS
EKG QTC CALCULATION (BAZETT): 446 MS
EKG R AXIS: -16 DEGREES
EKG T AXIS: 29 DEGREES
EKG VENTRICULAR RATE: 82 BPM
TROPONIN I SERPL HS-MCNC: 7 NG/L (ref 0–14)

## 2025-07-01 PROCEDURE — 93010 ELECTROCARDIOGRAM REPORT: CPT | Performed by: INTERNAL MEDICINE

## 2025-07-01 PROCEDURE — 96376 TX/PRO/DX INJ SAME DRUG ADON: CPT

## 2025-07-01 PROCEDURE — 6360000002 HC RX W HCPCS: Performed by: STUDENT IN AN ORGANIZED HEALTH CARE EDUCATION/TRAINING PROGRAM

## 2025-07-01 PROCEDURE — 94640 AIRWAY INHALATION TREATMENT: CPT

## 2025-07-01 PROCEDURE — 6370000000 HC RX 637 (ALT 250 FOR IP): Performed by: STUDENT IN AN ORGANIZED HEALTH CARE EDUCATION/TRAINING PROGRAM

## 2025-07-01 RX ORDER — MORPHINE SULFATE 4 MG/ML
4 INJECTION, SOLUTION INTRAMUSCULAR; INTRAVENOUS ONCE
Refills: 0 | Status: COMPLETED | OUTPATIENT
Start: 2025-07-01 | End: 2025-07-01

## 2025-07-01 RX ORDER — PREDNISONE 20 MG/1
60 TABLET ORAL DAILY
Qty: 15 TABLET | Refills: 0 | Status: SHIPPED | OUTPATIENT
Start: 2025-07-01 | End: 2025-07-06

## 2025-07-01 RX ORDER — IPRATROPIUM BROMIDE AND ALBUTEROL SULFATE 2.5; .5 MG/3ML; MG/3ML
1 SOLUTION RESPIRATORY (INHALATION) ONCE
Status: COMPLETED | OUTPATIENT
Start: 2025-07-01 | End: 2025-07-01

## 2025-07-01 RX ADMIN — IPRATROPIUM BROMIDE AND ALBUTEROL SULFATE 1 DOSE: .5; 2.5 SOLUTION RESPIRATORY (INHALATION) at 01:15

## 2025-07-01 RX ADMIN — MORPHINE SULFATE 4 MG: 4 INJECTION, SOLUTION INTRAMUSCULAR; INTRAVENOUS at 01:03

## 2025-07-01 ASSESSMENT — PAIN SCALES - GENERAL: PAINLEVEL_OUTOF10: 7

## 2025-07-01 ASSESSMENT — PAIN DESCRIPTION - LOCATION: LOCATION: GENERALIZED;FOOT

## 2025-07-01 NOTE — ED NOTES
Pt refuses to have peripheral iv and insists on mediport being accessed, pt placed in room, EKG was done as well as resp tx, meds were pulled from [facility] and given to rn/randi

## 2025-07-01 NOTE — ED PROVIDER NOTES
[facility] EMERGENCY DEPARTMENT  EMERGENCY DEPARTMENT ENCOUNTER      Pt Name: Jacque Juarez  MRN: [id number]  Birthdate 1960  Date of evaluation: 6/30/2025  Provider: Huber Ingram DO  PCP: Mart Apple MD  Note Started: 9:08 PM EDT 6/30/25    CHIEF COMPLAINT       Chief Complaint   Patient presents with    Shortness of Breath     Short of breath with bilat lower ext edema x 3 days,, seen here yesterday for same c/o       HISTORY OF PRESENT ILLNESS: 1 or more Elements   History From: Patient  Limitations to history : None    Jacque Juarez is a 65 y.o. female Past medical history polysubstance abuse as well as hepatitis B and sarcoidosis.  Patient presented chief complaint of shortness of breath.  Patient states that she was seen evaluated here last evening for some similar complaints.  Patient notes that today she developed some persistent shortness of breath as well as some mild worsening leg swelling.  Symptoms have been moderate in severity, since onset no exacerbating leaving factors noted.  Patient also notes pain to the bilateral lower extremities.  Patient denies any fevers, chills, abdominal pain, constipation or diarrhea.    Nursing Notes were all reviewed and agreed with or any disagreements were addressed in the HPI.    REVIEW OF SYSTEMS :    Positives and Pertinent negatives as per HPI.     PAST MEDICAL HISTORY/Chronic Conditions Affecting Care    has a past medical history of Arrhythmia, Arthritis, Hepatitis B, Sarcoidosis, and Ulcer.     SURGICAL HISTORY     Past Surgical History:   Procedure Laterality Date    CHOLECYSTECTOMY      DENTAL SURGERY      ECTOPIC PREGNANCY SURGERY      FOOT SURGERY Left     LIVER BIOPSY  2015       CURRENTMEDICATIONS       Discharge Medication List as of 7/1/2025  1:36 AM        CONTINUE these medications which have NOT CHANGED    Details   lidocaine (LIDODERM) 5 % Place 1 patch onto the skin daily for 10 days 12 hours on,

## 2025-07-01 NOTE — ED NOTES
Per patient she would like her power port accessed, Dr. Ingram notified and okayed for power port to be accessed.

## 2025-07-01 NOTE — DISCHARGE INSTRUCTIONS
Follow-up with primary care doctor  If you notice any new worrisome symptoms please return to the emergency department for evaluation